# Patient Record
Sex: MALE | Race: WHITE | URBAN - METROPOLITAN AREA
[De-identification: names, ages, dates, MRNs, and addresses within clinical notes are randomized per-mention and may not be internally consistent; named-entity substitution may affect disease eponyms.]

---

## 2021-09-22 ENCOUNTER — OFFICE VISIT (OUTPATIENT)
Dept: URBAN - METROPOLITAN AREA CLINIC 98 | Facility: CLINIC | Age: 36
End: 2021-09-22

## 2021-09-22 RX ORDER — INFLIXIMAB 100 MG/10ML
INFUSE 3 MG/KG OVER NO LESS THAN 2 HOUR(S) BY INTRAVENOUS ROUTE EVERY 8 WEEKS INJECTION, POWDER, LYOPHILIZED, FOR SOLUTION INTRAVENOUS
Qty: 1 | Refills: 0 | Status: ACTIVE | COMMUNITY
Start: 1900-01-01

## 2021-09-30 ENCOUNTER — TELEPHONE ENCOUNTER (OUTPATIENT)
Dept: URBAN - METROPOLITAN AREA CLINIC 92 | Facility: CLINIC | Age: 36
End: 2021-09-30

## 2021-09-30 ENCOUNTER — OFFICE VISIT (OUTPATIENT)
Dept: URBAN - METROPOLITAN AREA CLINIC 98 | Facility: CLINIC | Age: 36
End: 2021-09-30

## 2021-09-30 ENCOUNTER — WEB ENCOUNTER (OUTPATIENT)
Dept: URBAN - METROPOLITAN AREA CLINIC 98 | Facility: CLINIC | Age: 36
End: 2021-09-30

## 2021-09-30 RX ORDER — INFLIXIMAB 100 MG/10ML
10 MG/KG AS DIRECTED INJECTION, POWDER, LYOPHILIZED, FOR SOLUTION INTRAVENOUS
Qty: 1 BAG | Refills: 6 | OUTPATIENT
Start: 2021-09-30 | End: 2022-04-14

## 2021-09-30 RX ORDER — INFLIXIMAB 100 MG/10ML
INFUSE 3 MG/KG OVER NO LESS THAN 2 HOUR(S) BY INTRAVENOUS ROUTE EVERY 8 WEEKS INJECTION, POWDER, LYOPHILIZED, FOR SOLUTION INTRAVENOUS
Qty: 1 | Refills: 0 | Status: ACTIVE | COMMUNITY
Start: 1900-01-01

## 2021-09-30 NOTE — HPI-TODAY'S VISIT:
35 yo male with UC on Remicade 5 vials, Vernon brand, getting 5 vials q 8 weeks. Was seeing Dr. Phelan Last infusion was July 28, 9 weeks ago. Infusions went fine.  No pain diarrea or bleeding EIM none  Dates of pfizer vaccine - second dose Feb. Third dose today.  No primary MD  No other medical problems.  Takes zyrtec pre -Vernon Vit D Calcium  Ins BCBS Andrea Arciniega working at [employer] ---- was [facility]     ============================ 8/14/19  Follow-up UC    History Of Present Illness  This is a scheduled appointment for this patient, a 34 year old /White male, after a previous visit on 08/14/2019, for a follow-up evaluation of Ulcerative colitis.     35 yo male comes in for f'u\ Last seen 6/14  Wife Demian Darling and all leave for 1 year [facility] 9/18 or so  Last eVrnon level nwas 7/30 and was a 10 week interval------7.0  Prior infusion was 5/21  will check next level before departure for [place] 9/12 and departs 9/18.  Next infusion 9/12 and will be 6 week interval  No pain diarrhea bleeding. 3-5 formed stools a day.  Labs from 6/14 all wwnl.   Last dose of aza was June 14  Got Anser test denial by Goldie, as ususual  Last colonoscopyu 2/26/19 near deep remission  Ame Huerta MD at [facility]  ==== 6/14/19 35 yo male with UC comes in for 3 month f/u  Getting  5 mg/kg q 8 weeks after haing high dose 10 mg/kg q 4 and then de-escalate.  Now on aza 50 mg a day, down from 4 per day in years past.  No pain diarrhea or bleeding.  Will stop aza 50 mg and get 6TG level today.  Get vernon level at next trough.  see back in 2 months.  do colonoscopy late aug or sept well before departure.  Plan--- try stopping aza get level get trought ifx at next trough, on current 5mg/kg  Leaving for [facility] 9/19 f/u 2 months ck labs do colonoscopy  hopefully send on IFX monotherapy.     ============== 3/11/19  35 yo male with UC comes in for f/u.  Had  colonoscopy 2/26/18 and it showed no active inflamation and normal TI.  There were diffuse pseudopolyps.  No active UC syx, but GI syx increased withj Melech associated loss of sleep.  Has been taking Canasa for the past few months and anorectal syx/bleeding resolved.  Colon and bx, including the distal rectum  He can use Canasa prn and tub bath and consider Crohns if continued syx but likely not.  Considering an extended trip to [place] this summer.  Current insurance is [employer] thru [employer].  When they called about getting Remicade in [place], they were told it was prohibitive.  Last DEXA??8/14/18 showed osteopenia.  Taking caltrate with D. No h/o kidney stones.   Last eye exam ----??   ================= 11/14/18  32 yo male with severe UC doing miuch better  3 stools a day blood from hemorrhoids or anorectum. No rectal or abd pain..  Has not used Canasa yet.  Review of labs from Oct 9 shows low alb of 2.6 and mild elevation of calpro at 250 and lacto at 33.  He does not think he was doing better on Vernon every 4 weeks but backed off due to cesar trought level 3/12/18 of 45.6 and prior low level of 9.4 in 10/30/17  Baby melech 6'13oz and he is doing great.    doing well the past few months.   taking caltrate with D.   ============  32 yo male. Syx are stable. No pain diarrhea or bleeding except once a week or so. EIM- Saw Dr. Matthew for rash and dryness and hair falling out.  She said it was all autoimmune Clobetasol ointment for scalp Alclometasone cream for face. Hair has not regrown.  Asymmetric right post auricular.  Rectal bleeding is daily. 2-5 stools a day. Blood on wiping and not in the stool. Dexa scan done---showed osteopenia x 2 sites and normal at 1. No os Suggest caltrate with d bid.   Energy is good when he exercise and gets sleep. Weight training.  1/23/18 colonoscopy showed redness wqhich is inavtive UC as all bx were nl.  Quiescent. Willl repaet Jan or earlier.  6/15/18 calpro and lacto was 3x elevated so inflammation not fully controlled tho esr and crp were nl.  Current Vernon is 10 mg/kg = 700 mg every 8 weeks.  May need to shorten interval and will get TDM before next dose on Oct 9.  ========= 6/13/18 ov 32 yo male with UC dx 2015 comes in for 4 month f/u and is still doing very well.  No pain, diarrhea, or bleeding but gets these syx once a week.  AGWS.  EIM none.  Getting Remicade 10 mg/kg and "dandruff" and had it 6 months ago but did not mention.  Wife is pregnant.  No concerns.  Had IFX level of  45 on 10mg /kg q 4  3/18 4 week trough and prior level when inflamed was 9.  Advised reducing to 10 mg/kg q 8 and in process.  Had hi calpro      Past Medical History  Disease Name Date Onset Notes  Hypoalbuminemia 11/14/2018 --   IBS (irritable bowel syndrome) unk 06/01/2016 - 05/03/2016 - 01/20/2016 -   Rectal bleeding 09/14/2018 --   Ulcerative Colitis unk 07/01/2016 - 06/01/2016 - 05/03/2016 - 02/08/2016 - 01/20/2016 - 01/12/2016 - [facility] 09/17/2015 -   Ulcerative colitis 06/14/2019 --       Past Surgical History  Procedure Name Date Notes  Clavicle surgery unk 07/01/2016 - miniscusal 06/01/2016 - 06/01/2016 - unk   Colonoscopy 2/26/2019 03/11/2019 - 11/14/2018 - 09/14/2018 - 06/13/2018 - 02/12/2018 -       Medication List  Name Date Started Instructions  Remicade 100 mg intravenous recon soln  infuse 3 mg/kg over no less than 2 hour(s) by intravenous route every 8 weeks  Vitamin D3 5,000 unit oral tablet  --       Allergy List  Allergen Name Date Reaction Notes  No Known Environmental Allergies --  --  06/01/2016 - 05/03/2016 - 01/12/2016 - [facility] 10/28/2015 - 10/07/2015 - 09/17/2015 -   No Known Food Allergies --  --  06/01/2016 - 05/03/2016 - 01/12/2016 - [facility] 10/28/2015 - 10/07/2015 - 09/17/2015 -   other 7/27/2016 --  07/27/2016 - [facility]       Family Medical History  Disease Name Relative/Age Notes  Family history of Crohn's disease Brother/17  6/1/2016      Social History  Finding Status Start/Stop Quantity Notes  Alcohol Current some day --/-- --  08/14/2019 - 01/12/2017 - 11/10/2016 - 06/01/2016 - 05/03/2016 - 02/08/2016 - 01/20/2016 - 01/12/2016 - [facility] 10/28/2015 - 10/07/2015 - 09/17/2015 - 09/02/2015 - Once per week  Denies illicit substance abuse --  --/-- --  08/14/2019 - 03/11/2019 -   Tobacco Never --/-- --  06/14/2019 - 03/11/2019 - 11/14/2018 - 09/14/2018 - 06/13/2018 - 02/12/2018 - 10/20/2017 - 06/07/2017 - 03/15/2017 - 02/20/2017 - 01/12/2017 - 12/05/2016 -       Review of Systems  ConstitutionalDenies : body aches, chills, fatigue, fever, loss of appetite, malaise, night sweats, weight gain, weight loss  EyesDenies : blurred vision, visual changes  HENTDenies : Ear Pain/Ringing, hearing loss, Mouth Ulcers/Sores, nose bleeds, problems with gums/teeth, trouble swallowing  CardiovascularDenies : chest pain, leaky heart valves, heart murmur, heart racing/skipping, high blood pressure, palpitations  RespiratoryDenies : chronic cough, shortness of breath, wheezing or asthma symptoms  GastrointestinalDenies : Abdominal Pain/discomfort, Anal/Rectal Pain or Itching, Anal Spasm, Black Stool, Bloating/belching/gaseouness, Change of Bowel Habit, Constipation, Diarrhea/Loose Stool, Difficulty in Swallowing, Heartburn/esophageal reflux, Hemorrhoids, Indigestion, Mucus in Stool, Nausea/vomiting, Rectal Bleeding, Unintentional Weight Loss  GenitourinaryDenies : Blood in Urine, Burning/pain with Urination, frequency, Recent/frequent UTI, Kidney Stones  IntegumentDenies : itching/dry skin, jaundice, rashes, bumps or sores  NeurologicDenies : headaches, dizziness/vertigo, head trauma/injury, recent numbness/weakness, seizures  MusculoskeletalDenies : back pain, decreased range of motion, joint pain/arthritis, Problems Walking/calf or leg pain  EndocrineDenies : bruise easily, excessive thirst, heat/cold intolerance, history of high or low blood sugar  PsychiatricDenies : anxiety, changes in sleep pattern, depression, loss of memory  Heme-LymphDenies : Bleeding Problems, Enlarged Nodes/Swolen Glands, excessive bruising, history of anemia  Allergic-ImmunologicDenies : seasonal allergies    Vitals  Date Time BP Position Site L\R Cuff Size HR RR TEMP (F) WT  HT  BMI kg/m2 BSA m2 O2 Sat HC     08/14/2019 09:32 /70 Sitting    60 - R  97.9 136lbs 8oz 5'  7" 21.38 1.71        Physical Examination  ConstitutionalAppearance : Well nourished, well developed patient in no distress. Ambulating without difficulty.  Ability to Communicate : Normal communication ability  EyesConjunctivae and Eyelids : Conjunctivae and eyelids appear normal  Sclerae : White without injection  HENTHead :  Inspection : Normocephalic and atraumatic  Face :  Inspection : Face within normal limits  Ears :  External Ears : External ears within normal limits  Nose/Nasopharynx :  External Nose : External nose normal appearance, nares patent, no nasal discharge  Mouth and Throat :  General : Oral cavity appearance normal, breath odor normal  Lips : Appearance normal  NeckInspection and Palpation : Normal appearance without tenderness on palpation or deformities, trachea midline  Thyroid : Normal size without tenderness, nodules or masses  Jugular Veins : no JVD  ChestInspection of Chest : No lesions, deformities or traumatic injuries present. No significant scars are present.  Respiratory Effort : Breathing is unlabored without accessory muscle use  Palpation of Chest : Chest wall non-tender with no masses present. Tactile fremitus is normal  Auscultation : Normal breath sounds  CardiovascularHeart :  Auscultation : Heart rate is regular with normal rhythm. No murmurs are heard. No edema.  GastrointestinalAbdominal Exam : Abdomen soft without rigidity or guarding, abdomen non-tender to palpation, normal bowel sounds, no masses present, non-distended  Liver and Spleen : No hepatomegaly present. Liver is non-tender to palpation and spleen is not palpable.  LymphaticNeck : No lymphadenopathy present  Axillae : No lymphadenopathy present  Groin : No lymphadenopathy present  MusculoskeletalGait and Station : Normal Gait, normal station  Neck/Spine/Pelvis : No tenderness of deformities present.  SkinGeneral Inspection : No rashes, lesions or areas of discoloration present. Skin turgor is normal.  General Palpation : No abnormalities, masses or tenderness on palpation.  Neurologic and PsychiatricOrientation : Oriented to person, place and time  Sensation : Normal sensation  Memory : Short and long term memory intact.  Mood and Affect : Normal mood with an appropriate affect      Assessment  Ulcerative colitis     556.6  Hypoalbuminemia     273.8/E88.09  Rectal bleeding     569.3/K62.5  Ulcerative Colitis     556.0    Plan  OrdersPatient not identified as an unhealthy alcohol user when screene () - - 08/14/2019  Influenza immunization administered or previously received () - - 08/14/2019  BMI is documented within normal parameters and no follow-up plan () - - 08/14/2019  Current tobacco non-user (CAD, cap, COPD, PV) (dm) (IBD) (1036F, ) - - 08/14/2019  Colorectal cancer screening results documented and reviewed (PV) (3017F) - - 08/14/2019  Documentation of current medications. () - - 08/14/2019  CMP (comprehensive metabolic panel) (26763) - - 08/14/2019  Sed rate (33836) - - 08/14/2019  C-reactive protein (83196) - - 08/14/2019  CBC with diff (43903) - - 08/14/2019  Infliximab Concentration and Anti-Infliximab Antibody (02403) - - 08/14/2019  InstructionsI have documented a list of current medications and reviewed it with the patient.  I encouraged the patient to diet and exercise.  DispositionReturn To Clinic in 1 year    5 level 40 minute 80% counselling         Electronically Signed by: Uvaldo Meraz MD -Author on August 14, 2019 10:15:03 AM

## 2021-09-30 NOTE — PHYSICAL EXAM CONSTITUTIONAL:
normal, alert, in no acute distress, well developed, well nourished, ambulating without difficulty, normal communication ability 0.5

## 2021-10-10 LAB
A/G RATIO: 2.1
ALBUMIN: 5
ALKALINE PHOSPHATASE: 40
ALT (SGPT): 18
ANTI-INFLIXIMAB ANTIBODY: <22
AST (SGOT): 21
BASO (ABSOLUTE): 0
BASOS: 1
BILIRUBIN, TOTAL: 1.2
BUN/CREATININE RATIO: 12
BUN: 13
C-REACTIVE PROTEIN, QUANT: <1
CALCIUM: 10
CARBON DIOXIDE, TOTAL: 28
CHLORIDE: 103
CREATININE: 1.07
EGFR IF AFRICN AM: 104
EGFR IF NONAFRICN AM: 90
EOS (ABSOLUTE): 0.1
EOS: 2
FERRITIN, SERUM: 46
FOLATE (FOLIC ACID), SERUM: 16.3
GLOBULIN, TOTAL: 2.4
GLUCOSE: 95
HBSAG SCREEN: NEGATIVE
HEMATOCRIT: 44.6
HEMATOLOGY COMMENTS:: (no result)
HEMOGLOBIN: 14.9
HEP B SURFACE AB, QUAL: REACTIVE
IMMATURE CELLS: (no result)
IMMATURE GRANS (ABS): 0
IMMATURE GRANULOCYTES: 0
INFLIXIMAB DRUG LEVEL: 14
IRON BIND.CAP.(TIBC): 284
IRON SATURATION: 44
IRON: 124
LYMPHS (ABSOLUTE): 1.6
LYMPHS: 38
MCH: 31.4
MCHC: 33.4
MCV: 94
MONOCYTES(ABSOLUTE): 0.5
MONOCYTES: 12
NEUTROPHILS (ABSOLUTE): 2
NEUTROPHILS: 47
NRBC: (no result)
PLATELETS: 220
POTASSIUM: 5
PROTEIN, TOTAL: 7.4
QUANTIFERON CRITERIA: (no result)
QUANTIFERON INCUBATION: (no result)
QUANTIFERON MITOGEN VALUE: 3.15
QUANTIFERON NIL VALUE: 0.05
QUANTIFERON TB1 AG VALUE: 0.04
QUANTIFERON TB2 AG VALUE: 0.04
QUANTIFERON-TB GOLD PLUS: NEGATIVE
RBC: 4.75
RDW: 12.4
SEDIMENTATION RATE-WESTERGREN: 2
SODIUM: 142
UIBC: 160
VITAMIN B12: 443
VITAMIN D, 25-HYDROXY: 36.8
WBC: 4.2

## 2021-11-05 ENCOUNTER — OFFICE VISIT (OUTPATIENT)
Dept: URBAN - METROPOLITAN AREA CLINIC 91 | Facility: CLINIC | Age: 36
End: 2021-11-05
Payer: COMMERCIAL

## 2021-11-05 ENCOUNTER — LAB OUTSIDE AN ENCOUNTER (OUTPATIENT)
Dept: URBAN - METROPOLITAN AREA CLINIC 98 | Facility: CLINIC | Age: 36
End: 2021-11-05

## 2021-11-05 ENCOUNTER — TELEPHONE ENCOUNTER (OUTPATIENT)
Dept: URBAN - METROPOLITAN AREA CLINIC 18 | Facility: CLINIC | Age: 36
End: 2021-11-05

## 2021-11-05 VITALS
HEIGHT: 67 IN | RESPIRATION RATE: 16 BRPM | HEART RATE: 65 BPM | DIASTOLIC BLOOD PRESSURE: 70 MMHG | BODY MASS INDEX: 21.35 KG/M2 | SYSTOLIC BLOOD PRESSURE: 109 MMHG | TEMPERATURE: 97.6 F | WEIGHT: 136 LBS

## 2021-11-05 DIAGNOSIS — K51.80 CHRONIC PANCOLONIC ULCERATIVE COLITIS: ICD-10-CM

## 2021-11-05 PROCEDURE — 96413 CHEMO IV INFUSION 1 HR: CPT | Performed by: INTERNAL MEDICINE

## 2021-11-05 RX ORDER — INFLIXIMAB 100 MG/10ML
INFUSE 3 MG/KG OVER NO LESS THAN 2 HOUR(S) BY INTRAVENOUS ROUTE EVERY 8 WEEKS INJECTION, POWDER, LYOPHILIZED, FOR SOLUTION INTRAVENOUS
Qty: 1 | Refills: 0 | Status: ACTIVE | COMMUNITY
Start: 1900-01-01

## 2021-11-05 RX ORDER — INFLIXIMAB 100 MG/10ML
10 MG/KG AS DIRECTED INJECTION, POWDER, LYOPHILIZED, FOR SOLUTION INTRAVENOUS
Qty: 1 BAG | Refills: 6 | Status: ACTIVE | COMMUNITY
Start: 2021-09-30 | End: 2022-04-14

## 2021-11-15 LAB
CALPROTECTIN, FECAL: 186
LACTOFERRIN, FECAL, QUANT.: 18.71

## 2021-12-13 ENCOUNTER — OFFICE VISIT (OUTPATIENT)
Dept: URBAN - METROPOLITAN AREA CLINIC 98 | Facility: CLINIC | Age: 36
End: 2021-12-13
Payer: COMMERCIAL

## 2021-12-13 DIAGNOSIS — K51.018 CHRONIC ULCERATIVE ENTEROCOLITIS WITH OTHER COMPLICATION: ICD-10-CM

## 2021-12-13 DIAGNOSIS — K62.5 RECTAL BLEEDING: ICD-10-CM

## 2021-12-13 DIAGNOSIS — E88.09 HYPOALBUMINEMIA: ICD-10-CM

## 2021-12-13 PROCEDURE — 99214 OFFICE O/P EST MOD 30 MIN: CPT | Performed by: INTERNAL MEDICINE

## 2021-12-13 RX ORDER — INFLIXIMAB 100 MG/10ML
INFUSE 3 MG/KG OVER NO LESS THAN 2 HOUR(S) BY INTRAVENOUS ROUTE EVERY 8 WEEKS INJECTION, POWDER, LYOPHILIZED, FOR SOLUTION INTRAVENOUS
Qty: 1 | Refills: 0 | Status: ACTIVE | COMMUNITY
Start: 1900-01-01

## 2021-12-13 NOTE — HPI-TODAY'S VISIT:
35 yo male comes in with Melech. Recent stool tests showed inflammation. Started a job, [employer].  No syx. lacto/calpro were eleated at 18.6 and 186.  Vernon level at 4 weeks was 14 on 10/10/21 with next dose on 11/5  Interval was longer maybe 12 weeks when he came back from [place] and got scheduled late so let check labs and lacto/calpro in 2 cycles more  Lab review from 2/21/22 showed normalization of biomarkers with nl lacto, calpro, and low titer IFX ati. Likely wnl.  ============================= 9/30/21  35 yo male with UC on Remicade 5 vials, Vernon brand, getting 5 vials q 8 weeks. Was seeing Dr. Phelan Last infusion was July 28, 9 weeks ago. Infusions went fine.  No pain diarrea or bleeding EIM none  Dates of pfizer vaccine - second dose Feb. Third dose today.  No primary MD  No other medical problems.  Takes zyrtec pre -Vernon Vit D Calcium  Ins [employer] Prempam Arcin[employer]ga working at [employer] ---- was [facility] [employer]     ============================ 8/14/19  Follow-up UC    History Of Present Illness  This is a scheduled appointment for this patient, a 34 year old /White male, after a previous visit on 08/14/2019, for a follow-up evaluation of Ulcerative colitis.     33 yo male comes in for f'u\ Last seen 6/14  Wife Demian Darling and all leave for 1 year [facility] 9/18 or so  Last Vernno level nwas 7/30 and was a 10 week interval------7.0  Prior infusion was 5/21  will check next level before departure for [place] 9/12 and departs 9/18.  Next infusion 9/12 and will be 6 week interval  No pain diarrhea bleeding. 3-5 formed stools a day.  Labs from 6/14 all wwnl.   Last dose of aza was June 14  Got Anser test denial by [employer], as ususual  Last colonoscopyu 2/26/19 near deep remission  Ame Huerta MD at [facility]  ==== 6/14/19 33 yo male with UC comes in for 3 month f/u  Getting  5 mg/kg q 8 weeks after haing high dose 10 mg/kg q 4 and then de-escalate.  Now on aza 50 mg a day, down from 4 per day in years past.  No pain diarrhea or bleeding.  Will stop aza 50 mg and get 6TG level today.  Get vernon level at next trough.  see back in 2 months.  do colonoscopy late aug or sept well before departure.  Plan--- try stopping aza get level get trought ifx at next trough, on current 5mg/kg  Leaving for [place] 9/19 f/u 2 months ck labs do colonoscopy  hopefully send on IFX monotherapy.     ============== 3/11/19  33 yo male with UC comes in for f/u.  Had  colonoscopy 2/26/18 and it showed no active inflamation and normal TI.  There were diffuse pseudopolyps.  No active UC syx, but GI syx increased withj Melech associated loss of sleep.  Has been taking Canasa for the past few months and anorectal syx/bleeding resolved.  Colon and bx, including the distal rectum  He can use Canasa prn and tub bath and consider Crohns if continued syx but likely not.  Considering an extended trip to [place] this summer.  Current insurance is [employer] thru [employer].  When they called about getting Remicade in [place], they were told it was prohibitive.  Last DEXA??8/14/18 showed osteopenia.  Taking caltrate with D. No h/o kidney stones.   Last eye exam ----??   ================= 11/14/18  34 yo male with severe UC doing miuch better  3 stools a day blood from hemorrhoids or anorectum. No rectal or abd pain..  Has not used Canasa yet.  Review of labs from Oct 9 shows low alb of 2.6 and mild elevation of calpro at 250 and lacto at 33.  He does not think he was doing better on Vernon every 4 weeks but backed off due to cesar trought level 3/12/18 of 45.6 and prior low level of 9.4 in 10/30/17  Baby melech 6'13oz and he is doing great.    doing well the past few months.   taking caltrate with D.   ============  34 yo male. Syx are stable. No pain diarrhea or bleeding except once a week or so. EIM- Saw Dr. Matthew for rash and dryness and hair falling out.  She said it was all autoimmune Clobetasol ointment for scalp Alclometasone cream for face. Hair has not regrown.  Asymmetric right post auricular.  Rectal bleeding is daily. 2-5 stools a day. Blood on wiping and not in the stool. Dexa scan done---showed osteopenia x 2 sites and normal at 1. No os Suggest caltrate with d bid.   Energy is good when he exercise and gets sleep. Weight training.  1/23/18 colonoscopy showed redness wqhich is inavtive UC as all bx were nl.  Quiescent. Willl repaet Jan or earlier.  6/15/18 calpro and lacto was 3x elevated so inflammation not fully controlled tho esr and crp were nl.  Current Vernon is 10 mg/kg = 700 mg every 8 weeks.  May need to shorten interval and will get TDM before next dose on Oct 9.  ========= 6/13/18 ov 34 yo male with UC dx 2015 comes in for 4 month f/u and is still doing very well.  No pain, diarrhea, or bleeding but gets these syx once a week.  AGWS.  EIM none.  Getting Remicade 10 mg/kg and "dandruff" and had it 6 months ago but did not mention.  Wife is pregnant.  No concerns.  Had IFX level of  45 on 10mg /kg q 4  3/18 4 week trough and prior level when inflamed was 9.  Advised reducing to 10 mg/kg q 8 and in process.  Had hi calpro      Past Medical History  Disease Name Date Onset Notes  Hypoalbuminemia 11/14/2018 --   IBS (irritable bowel syndrome) unk 06/01/2016 - 05/03/2016 - 01/20/2016 -   Rectal bleeding 09/14/2018 --   Ulcerative Colitis unk 07/01/2016 - 06/01/2016 - 05/03/2016 - 02/08/2016 - 01/20/2016 - 01/12/2016 - lsh 09/17/2015 -   Ulcerative colitis 06/14/2019 --       Past Surgical History  Procedure Name Date Notes  Clavicle surgery unk 07/01/2016 - miniscusal 06/01/2016 - 06/01/2016 - unk   Colonoscopy 2/26/2019 03/11/2019 - 11/14/2018 - 09/14/2018 - 06/13/2018 - 02/12/2018 -       Medication List  Name Date Started Instructions  Remicade 100 mg intravenous recon soln  infuse 3 mg/kg over no less than 2 hour(s) by intravenous route every 8 weeks  Vitamin D3 5,000 unit oral tablet  --       Allergy List  Allergen Name Date Reaction Notes  No Known Environmental Allergies --  --  06/01/2016 - 05/03/2016 - 01/12/2016 - [facility] 10/28/2015 - 10/07/2015 - 09/17/2015 -   No Known Food Allergies --  --  06/01/2016 - 05/03/2016 - 01/12/2016 - [facility] 10/28/2015 - 10/07/2015 - 09/17/2015 -   other 7/27/2016 --  07/27/2016 - [facility]       Family Medical History  Disease Name Relative/Age Notes  Family history of Crohn's disease Brother/17  6/1/2016      Social History  Finding Status Start/Stop Quantity Notes  Alcohol Current some day --/-- --  08/14/2019 - 01/12/2017 - 11/10/2016 - 06/01/2016 - 05/03/2016 - 02/08/2016 - 01/20/2016 - 01/12/2016 - [facility] 10/28/2015 - 10/07/2015 - 09/17/2015 - 09/02/2015 - Once per week  Denies illicit substance abuse --  --/-- --  08/14/2019 - 03/11/2019 -   Tobacco Never --/-- --  06/14/2019 - 03/11/2019 - 11/14/2018 - 09/14/2018 - 06/13/2018 - 02/12/2018 - 10/20/2017 - 06/07/2017 - 03/15/2017 - 02/20/2017 - 01/12/2017 - 12/05/2016 -       Review of Systems  ConstitutionalDenies : body aches, chills, fatigue, fever, loss of appetite, malaise, night sweats, weight gain, weight loss  EyesDenies : blurred vision, visual changes  HENTDenies : Ear Pain/Ringing, hearing loss, Mouth Ulcers/Sores, nose bleeds, problems with gums/teeth, trouble swallowing  CardiovascularDenies : chest pain, leaky heart valves, heart murmur, heart racing/skipping, high blood pressure, palpitations  RespiratoryDenies : chronic cough, shortness of breath, wheezing or asthma symptoms  GastrointestinalDenies : Abdominal Pain/discomfort, Anal/Rectal Pain or Itching, Anal Spasm, Black Stool, Bloating/belching/gaseouness, Change of Bowel Habit, Constipation, Diarrhea/Loose Stool, Difficulty in Swallowing, Heartburn/esophageal reflux, Hemorrhoids, Indigestion, Mucus in Stool, Nausea/vomiting, Rectal Bleeding, Unintentional Weight Loss  GenitourinaryDenies : Blood in Urine, Burning/pain with Urination, frequency, Recent/frequent UTI, Kidney Stones  IntegumentDenies : itching/dry skin, jaundice, rashes, bumps or sores  NeurologicDenies : headaches, dizziness/vertigo, head trauma/injury, recent numbness/weakness, seizures  MusculoskeletalDenies : back pain, decreased range of motion, joint pain/arthritis, Problems Walking/calf or leg pain  EndocrineDenies : bruise easily, excessive thirst, heat/cold intolerance, history of high or low blood sugar  PsychiatricDenies : anxiety, changes in sleep pattern, depression, loss of memory  Heme-LymphDenies : Bleeding Problems, Enlarged Nodes/Swolen Glands, excessive bruising, history of anemia  Allergic-ImmunologicDenies : seasonal allergies    Vitals  Date Time BP Position Site L\R Cuff Size HR RR TEMP (F) WT  HT  BMI kg/m2 BSA m2 O2 Sat      08/14/2019 09:32 /70 Sitting    60 - R  97.9 136lbs 8oz 5'  7" 21.38 1.71        Physical Examination  ConstitutionalAppearance : Well nourished, well developed patient in no distress. Ambulating without difficulty.  Ability to Communicate : Normal communication ability  EyesConjunctivae and Eyelids : Conjunctivae and eyelids appear normal  Sclerae : White without injection  HENTHead :  Inspection : Normocephalic and atraumatic  Face :  Inspection : Face within normal limits  Ears :  External Ears : External ears within normal limits  Nose/Nasopharynx :  External Nose : External nose normal appearance, nares patent, no nasal discharge  Mouth and Throat :  General : Oral cavity appearance normal, breath odor normal  Lips : Appearance normal  NeckInspection and Palpation : Normal appearance without tenderness on palpation or deformities, trachea midline  Thyroid : Normal size without tenderness, nodules or masses  Jugular Veins : no JVD  ChestInspection of Chest : No lesions, deformities or traumatic injuries present. No significant scars are present.  Respiratory Effort : Breathing is unlabored without accessory muscle use  Palpation of Chest : Chest wall non-tender with no masses present. Tactile fremitus is normal  Auscultation : Normal breath sounds  CardiovascularHeart :  Auscultation : Heart rate is regular with normal rhythm. No murmurs are heard. No edema.  GastrointestinalAbdominal Exam : Abdomen soft without rigidity or guarding, abdomen non-tender to palpation, normal bowel sounds, no masses present, non-distended  Liver and Spleen : No hepatomegaly present. Liver is non-tender to palpation and spleen is not palpable.  LymphaticNeck : No lymphadenopathy present  Axillae : No lymphadenopathy present  Groin : No lymphadenopathy present  MusculoskeletalGait and Station : Normal Gait, normal station  Neck/Spine/Pelvis : No tenderness of deformities present.  SkinGeneral Inspection : No rashes, lesions or areas of discoloration present. Skin turgor is normal.  General Palpation : No abnormalities, masses or tenderness on palpation.  Neurologic and PsychiatricOrientation : Oriented to person, place and time  Sensation : Normal sensation  Memory : Short and long term memory intact.  Mood and Affect : Normal mood with an appropriate affect      Assessment  Ulcerative colitis     556.6  Hypoalbuminemia     273.8/E88.09  Rectal bleeding     569.3/K62.5  Ulcerative Colitis     556.0    Plan  OrdersPatient not identified as an unhealthy alcohol user when screene () - - 08/14/2019  Influenza immunization administered or previously received () - - 08/14/2019  BMI is documented within normal parameters and no follow-up plan () - - 08/14/2019  Current tobacco non-user (CAD, cap, COPD, PV) (dm) (IBD) (1036F, ) - - 08/14/2019  Colorectal cancer screening results documented and reviewed (PV) (3017F) - - 08/14/2019  Documentation of current medications. () - - 08/14/2019  CMP (comprehensive metabolic panel) (58325) - - 08/14/2019  Sed rate (05459) - - 08/14/2019  C-reactive protein (91212) - - 08/14/2019  CBC with diff (45417) - - 08/14/2019  Infliximab Concentration and Anti-Infliximab Antibody (11086) - - 08/14/2019  InstructionsI have documented a list of current medications and reviewed it with the patient.  I encouraged the patient to diet and exercise.  DispositionReturn To Clinic in 1 year    5 level 40 minute 80% counselling         Electronically Signed by: Uvaldo Meraz MD -Author on August 14, 2019 10:15:03 AM

## 2021-12-16 ENCOUNTER — TELEPHONE ENCOUNTER (OUTPATIENT)
Dept: URBAN - METROPOLITAN AREA CLINIC 92 | Facility: CLINIC | Age: 36
End: 2021-12-16

## 2021-12-30 ENCOUNTER — OFFICE VISIT (OUTPATIENT)
Dept: URBAN - METROPOLITAN AREA CLINIC 91 | Facility: CLINIC | Age: 36
End: 2021-12-30
Payer: COMMERCIAL

## 2021-12-30 DIAGNOSIS — K51.80 CHRONIC PANCOLONIC ULCERATIVE COLITIS: ICD-10-CM

## 2021-12-30 PROCEDURE — 96413 CHEMO IV INFUSION 1 HR: CPT | Performed by: INTERNAL MEDICINE

## 2021-12-30 RX ORDER — INFLIXIMAB 100 MG/10ML
INFUSE 3 MG/KG OVER NO LESS THAN 2 HOUR(S) BY INTRAVENOUS ROUTE EVERY 8 WEEKS INJECTION, POWDER, LYOPHILIZED, FOR SOLUTION INTRAVENOUS
Qty: 1 | Refills: 0 | Status: ACTIVE | COMMUNITY
Start: 1900-01-01

## 2022-01-29 ENCOUNTER — TELEPHONE ENCOUNTER (OUTPATIENT)
Dept: URBAN - METROPOLITAN AREA CLINIC 92 | Facility: CLINIC | Age: 37
End: 2022-01-29

## 2022-01-29 RX ORDER — INFLIXIMAB 100 MG/10ML
AS DIRECTED INJECTION, POWDER, LYOPHILIZED, FOR SOLUTION INTRAVENOUS
Qty: 100 MILLIGRAMS | Refills: 0 | OUTPATIENT
Start: 2022-01-29 | End: 2022-02-28

## 2022-02-24 ENCOUNTER — LAB OUTSIDE AN ENCOUNTER (OUTPATIENT)
Dept: URBAN - METROPOLITAN AREA CLINIC 98 | Facility: CLINIC | Age: 37
End: 2022-02-24

## 2022-02-24 ENCOUNTER — OFFICE VISIT (OUTPATIENT)
Dept: URBAN - METROPOLITAN AREA CLINIC 97 | Facility: CLINIC | Age: 37
End: 2022-02-24
Payer: COMMERCIAL

## 2022-02-24 VITALS
SYSTOLIC BLOOD PRESSURE: 110 MMHG | TEMPERATURE: 96.8 F | RESPIRATION RATE: 18 BRPM | WEIGHT: 139 LBS | BODY MASS INDEX: 21.82 KG/M2 | HEART RATE: 67 BPM | DIASTOLIC BLOOD PRESSURE: 74 MMHG | HEIGHT: 67 IN

## 2022-02-24 DIAGNOSIS — K51.80 ULCERATIVE COLITIS: ICD-10-CM

## 2022-02-24 PROCEDURE — 96413 CHEMO IV INFUSION 1 HR: CPT | Performed by: INTERNAL MEDICINE

## 2022-02-24 RX ORDER — INFLIXIMAB 100 MG/10ML
AS DIRECTED INJECTION, POWDER, LYOPHILIZED, FOR SOLUTION INTRAVENOUS
Qty: 100 MILLIGRAMS | Refills: 0 | Status: ACTIVE | COMMUNITY
Start: 2022-01-29 | End: 2022-02-28

## 2022-02-24 RX ORDER — INFLIXIMAB 100 MG/10ML
INFUSE 3 MG/KG OVER NO LESS THAN 2 HOUR(S) BY INTRAVENOUS ROUTE EVERY 8 WEEKS INJECTION, POWDER, LYOPHILIZED, FOR SOLUTION INTRAVENOUS
Qty: 1 | Refills: 0 | Status: ACTIVE | COMMUNITY
Start: 1900-01-01

## 2022-03-07 LAB
A/G RATIO: 2.1
ALBUMIN: 4.8
ALKALINE PHOSPHATASE: 41
ALT (SGPT): 27
ANTI-INFLIXIMAB ANTIBODY: 55
AST (SGOT): 23
BASO (ABSOLUTE): 0
BASOS: 0
BILIRUBIN, TOTAL: 1.4
BUN/CREATININE RATIO: 13
BUN: 14
C-REACTIVE PROTEIN, QUANT: <1
CALCIUM: 9.8
CALPROTECTIN, FECAL: 65
CARBON DIOXIDE, TOTAL: 24
CHLORIDE: 104
CREATININE: 1.04
EGFR IF AFRICN AM: 108
EGFR IF NONAFRICN AM: 93
EOS (ABSOLUTE): 0.1
EOS: 2
GLOBULIN, TOTAL: 2.3
GLUCOSE: 88
HEMATOCRIT: 43.4
HEMATOLOGY COMMENTS:: (no result)
HEMOGLOBIN: 14.4
IMMATURE CELLS: (no result)
IMMATURE GRANS (ABS): 0
IMMATURE GRANULOCYTES: 0
INFLIXIMAB DRUG LEVEL: 13
LACTOFERRIN, FECAL, QUANT.: 1.6
LYMPHS (ABSOLUTE): 2
LYMPHS: 39
MCH: 31
MCHC: 33.2
MCV: 93
MONOCYTES(ABSOLUTE): 0.5
MONOCYTES: 10
NEUTROPHILS (ABSOLUTE): 2.6
NEUTROPHILS: 49
NRBC: (no result)
PLATELETS: 221
POTASSIUM: 4.4
PROTEIN, TOTAL: 7.1
RBC: 4.65
RDW: 12.4
SEDIMENTATION RATE-WESTERGREN: 2
SODIUM: 142
WBC: 5.3

## 2022-04-25 ENCOUNTER — OFFICE VISIT (OUTPATIENT)
Dept: URBAN - METROPOLITAN AREA CLINIC 97 | Facility: CLINIC | Age: 37
End: 2022-04-25
Payer: COMMERCIAL

## 2022-04-25 ENCOUNTER — TELEPHONE ENCOUNTER (OUTPATIENT)
Dept: URBAN - METROPOLITAN AREA CLINIC 97 | Facility: CLINIC | Age: 37
End: 2022-04-25

## 2022-04-25 VITALS
HEIGHT: 67 IN | HEART RATE: 59 BPM | RESPIRATION RATE: 18 BRPM | WEIGHT: 142 LBS | TEMPERATURE: 96.9 F | DIASTOLIC BLOOD PRESSURE: 79 MMHG | SYSTOLIC BLOOD PRESSURE: 120 MMHG | BODY MASS INDEX: 22.29 KG/M2

## 2022-04-25 DIAGNOSIS — K50.80 CROHN'S COLITIS: ICD-10-CM

## 2022-04-25 PROCEDURE — 96413 CHEMO IV INFUSION 1 HR: CPT | Performed by: INTERNAL MEDICINE

## 2022-04-25 RX ORDER — INFLIXIMAB 100 MG/10ML
INFUSE 3 MG/KG OVER NO LESS THAN 2 HOUR(S) BY INTRAVENOUS ROUTE EVERY 8 WEEKS INJECTION, POWDER, LYOPHILIZED, FOR SOLUTION INTRAVENOUS
Qty: 1 | Refills: 0 | Status: ACTIVE | COMMUNITY
Start: 1900-01-01

## 2022-06-08 ENCOUNTER — OFFICE VISIT (OUTPATIENT)
Dept: URBAN - METROPOLITAN AREA CLINIC 98 | Facility: CLINIC | Age: 37
End: 2022-06-08
Payer: COMMERCIAL

## 2022-06-08 DIAGNOSIS — K50.80 CROHN'S COLITIS: ICD-10-CM

## 2022-06-08 DIAGNOSIS — K62.5 RECTAL BLEEDING: ICD-10-CM

## 2022-06-08 DIAGNOSIS — K51.018 CHRONIC ULCERATIVE ENTEROCOLITIS WITH OTHER COMPLICATION: ICD-10-CM

## 2022-06-08 DIAGNOSIS — E88.09 HYPOALBUMINEMIA: ICD-10-CM

## 2022-06-08 PROCEDURE — 99215 OFFICE O/P EST HI 40 MIN: CPT | Performed by: INTERNAL MEDICINE

## 2022-06-08 RX ORDER — CALCIUM CARBONATE 500(1250)
1 TABLET WITH MEALS TABLET ORAL ONCE A DAY
Status: ACTIVE | COMMUNITY

## 2022-06-08 RX ORDER — INFLIXIMAB 100 MG/10ML
INFUSE 3 MG/KG OVER NO LESS THAN 2 HOUR(S) BY INTRAVENOUS ROUTE EVERY 8 WEEKS INJECTION, POWDER, LYOPHILIZED, FOR SOLUTION INTRAVENOUS
Qty: 1 | Refills: 0 | Status: ACTIVE | COMMUNITY
Start: 1900-01-01

## 2022-06-08 NOTE — HPI-TODAY'S VISIT:
38 yo male returns for 6 month f/u visit.  Doing well. No pain diarrhea or bleeddng, except some stress with lack of sleep and normalized. Typically 3-5 stools a day.  No other medical issues.  Overuse joint issues, no others.  Nazia good with job. 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  ===Timothy got both doses of Pfizer vax in [place] and got boosted Oct 2021 Needs second booster now. Get another in 4-6 months. Lacto and calpro - normalized after a couople adddiotnal cycles of remicade and got back on schedule.  Burps every day.  when hungry. Very common. Does get chest pain and when he burps, it goes away.      ========================= 12/13/21  35 yo male comes in with Darcy. Recent stool tests showed inflammation. Started a job, [employer].  No syx. lacto/calpro were eleated at 18.6 and 186.  Vernon level at 4 weeks was 14 on 10/10/21 with next dose on 11/5  Interval was longer maybe 12 weeks when he came back from [place] and got scheduled late so let check labs and lacto/calpro in 2 cycles more  ============================= 9/30/21  35 yo male with UC on Remicade 5 vials, Vernon brand, getting 5 vials q 8 weeks. Was seeing Dr. Phelan Last infusion was July 28, 9 weeks ago. Infusions went fine.  No pain diarrea or bleeding EIM none  Dates of pfizer vaccine - second dose Feb. Third dose today.  No primary MD  No other medical problems.  Takes zyrtec pre -Vernon Vit D Calcium  Ins [employer]   Helaina working at [employer] ---- was [employer]     ============================ 8/14/19  Follow-up UC    History Of Present Illness  This is a scheduled appointment for this patient, a 34 year old /White male, after a previous visit on 08/14/2019, for a follow-up evaluation of Ulcerative colitis.     35 yo male comes in for f'u\ Last seen 6/14  Wife Demian Darling and all leave for 1 year [place] 9/18 or so  Last Vernon level nwas 7/30 and was a 10 week interval------7.0  Prior infusion was 5/21  will check next level before departure for [place] 9/12 and departs 9/18.  Next infusion 9/12 and will be 6 week interval  No pain diarrhea bleeding. 3-5 formed stools a day.  Labs from 6/14 all wwnl.   Last dose of aza was June 14  Got Anser test denial by [employer], as ususual  Last colonoscopyu 2/26/19 near deep remission  Ame Huerta MD at [facility]  ==== 6/14/19 35 yo male with UC comes in for 3 month f/u  Getting  5 mg/kg q 8 weeks after haing high dose 10 mg/kg q 4 and then de-escalate.  Now on aza 50 mg a day, down from 4 per day in years past.  No pain diarrhea or bleeding.  Will stop aza 50 mg and get 6TG level today.  Get vernon level at next trough.  see back in 2 months.  do colonoscopy late aug or sept well before departure.  Plan--- try stopping aza get level get trought ifx at next trough, on current 5mg/kg  Leaving for [place] 9/19 f/u 2 months ck labs do colonoscopy  hopefully send on IFX monotherapy.     ============== 3/11/19  35 yo male with UC comes in for f/u.  Had  colonoscopy 2/26/18 and it showed no active inflamation and normal TI.  There were diffuse pseudopolyps.  No active UC syx, but GI syx increased withj Melech associated loss of sleep.  Has been taking Canasa for the past few months and anorectal syx/bleeding resolved.  Colon and bx, including the distal rectum  He can use Canasa prn and tub bath and consider Crohns if continued syx but likely not.  Considering an extended trip to [place] this summer.  Current insurance is [employer] [place] thru [place].  When they called about getting Remicade in [place], they were told it was prohibitive.  Last DEXA??8/14/18 showed osteopenia.  Taking caltrate with D. No h/o kidney stones.   Last eye exam ----??   ================= 11/14/18  32 yo male with severe UC doing miuch better  3 stools a day blood from hemorrhoids or anorectum. No rectal or abd pain..  Has not used Canasa yet.  Review of labs from Oct 9 shows low alb of 2.6 and mild elevation of calpro at 250 and lacto at 33.  He does not think he was doing better on Vernon every 4 weeks but backed off due to cesar trought level 3/12/18 of 45.6 and prior low level of 9.4 in 10/30/17  Baby melech 6'13oz and he is doing great.    doing well the past few months.   taking caltrate with D.   ============  32 yo male. Syx are stable. No pain diarrhea or bleeding except once a week or so. EIM- Saw Dr. Matthew for rash and dryness and hair falling out.  She said it was all autoimmune Clobetasol ointment for scalp Alclometasone cream for face. Hair has not regrown.  Asymmetric right post auricular.  Rectal bleeding is daily. 2-5 stools a day. Blood on wiping and not in the stool. Dexa scan done---showed osteopenia x 2 sites and normal at 1. No os Suggest caltrate with d bid.   Energy is good when he exercise and gets sleep. Weight training.  1/23/18 colonoscopy showed redness wqhich is inavtive UC as all bx were nl.  Quiescent. Willl repaet Jan or earlier.  6/15/18 calpro and lacto was 3x elevated so inflammation not fully controlled tho esr and crp were nl.  Current Vernon is 10 mg/kg = 700 mg every 8 weeks.  May need to shorten interval and will get TDM before next dose on Oct 9.  ========= 6/13/18 ov 32 yo male with UC dx 2015 comes in for 4 month f/u and is still doing very well.  No pain, diarrhea, or bleeding but gets these syx once a week.  AGWS.  EIM none.  Getting Remicade 10 mg/kg and "dandruff" and had it 6 months ago but did not mention.  Wife is pregnant.  No concerns.  Had IFX level of  45 on 10mg /kg q 4  3/18 4 week trough and prior level when inflamed was 9.  Advised reducing to 10 mg/kg q 8 and in process.  Had dieudonne donaldson      Past Medical History  Disease Name Date Onset Notes  Hypoalbuminemia 11/14/2018 --   IBS (irritable bowel syndrome) unk 06/01/2016 - 05/03/2016 - 01/20/2016 -   Rectal bleeding 09/14/2018 --   Ulcerative Colitis unk 07/01/2016 - 06/01/2016 - 05/03/2016 - 02/08/2016 - 01/20/2016 - 01/12/2016 - [facility] 09/17/2015 -   Ulcerative colitis 06/14/2019 --       Past Surgical History  Procedure Name Date Notes  Clavicle surgery unk 07/01/2016 - miniscusal 06/01/2016 - 06/01/2016 - unk   Colonoscopy 2/26/2019 03/11/2019 - 11/14/2018 - 09/14/2018 - 06/13/2018 - 02/12/2018 -       Medication List  Name Date Started Instructions  Remicade 100 mg intravenous recon soln  infuse 3 mg/kg over no less than 2 hour(s) by intravenous route every 8 weeks  Vitamin D3 5,000 unit oral tablet  --       Allergy List  Allergen Name Date Reaction Notes  No Known Environmental Allergies --  --  06/01/2016 - 05/03/2016 - 01/12/2016 - [facility] 10/28/2015 - 10/07/2015 - 09/17/2015 -   No Known Food Allergies --  --  06/01/2016 - 05/03/2016 - 01/12/2016 - [facility] 10/28/2015 - 10/07/2015 - 09/17/2015 -   other 7/27/2016 --  07/27/2016 - [facility]       Family Medical History  Disease Name Relative/Age Notes  Family history of Crohn's disease Brother/17  6/1/2016      Social History  Finding Status Start/Stop Quantity Notes  Alcohol Current some day --/-- --  08/14/2019 - 01/12/2017 - 11/10/2016 - 06/01/2016 - 05/03/2016 - 02/08/2016 - 01/20/2016 - 01/12/2016 - [facility] 10/28/2015 - 10/07/2015 - 09/17/2015 - 09/02/2015 - Once per week  Denies illicit substance abuse --  --/-- --  08/14/2019 - 03/11/2019 -   Tobacco Never --/-- --  06/14/2019 - 03/11/2019 - 11/14/2018 - 09/14/2018 - 06/13/2018 - 02/12/2018 - 10/20/2017 - 06/07/2017 - 03/15/2017 - 02/20/2017 - 01/12/2017 - 12/05/2016 -       Review of Systems  ConstitutionalDenies : body aches, chills, fatigue, fever, loss of appetite, malaise, night sweats, weight gain, weight loss  EyesDenies : blurred vision, visual changes  HENTDenies : Ear Pain/Ringing, hearing loss, Mouth Ulcers/Sores, nose bleeds, problems with gums/teeth, trouble swallowing  CardiovascularDenies : chest pain, leaky heart valves, heart murmur, heart racing/skipping, high blood pressure, palpitations  RespiratoryDenies : chronic cough, shortness of breath, wheezing or asthma symptoms  GastrointestinalDenies : Abdominal Pain/discomfort, Anal/Rectal Pain or Itching, Anal Spasm, Black Stool, Bloating/belching/gaseouness, Change of Bowel Habit, Constipation, Diarrhea/Loose Stool, Difficulty in Swallowing, Heartburn/esophageal reflux, Hemorrhoids, Indigestion, Mucus in Stool, Nausea/vomiting, Rectal Bleeding, Unintentional Weight Loss  GenitourinaryDenies : Blood in Urine, Burning/pain with Urination, frequency, Recent/frequent UTI, Kidney Stones  IntegumentDenies : itching/dry skin, jaundice, rashes, bumps or sores  NeurologicDenies : headaches, dizziness/vertigo, head trauma/injury, recent numbness/weakness, seizures  MusculoskeletalDenies : back pain, decreased range of motion, joint pain/arthritis, Problems Walking/calf or leg pain  EndocrineDenies : bruise easily, excessive thirst, heat/cold intolerance, history of high or low blood sugar  PsychiatricDenies : anxiety, changes in sleep pattern, depression, loss of memory  Heme-LymphDenies : Bleeding Problems, Enlarged Nodes/Swolen Glands, excessive bruising, history of anemia  Allergic-ImmunologicDenies : seasonal allergies    Vitals  Date Time BP Position Site L\R Cuff Size HR RR TEMP (F) WT  HT  BMI kg/m2 BSA m2 O2 Sat HC     08/14/2019 09:32 /70 Sitting    60 - R  97.9 136lbs 8oz 5'  7" 21.38 1.71        Physical Examination  ConstitutionalAppearance : Well nourished, well developed patient in no distress. Ambulating without difficulty.  Ability to Communicate : Normal communication ability  EyesConjunctivae and Eyelids : Conjunctivae and eyelids appear normal  Sclerae : White without injection  HENTHead :  Inspection : Normocephalic and atraumatic  Face :  Inspection : Face within normal limits  Ears :  External Ears : External ears within normal limits  Nose/Nasopharynx :  External Nose : External nose normal appearance, nares patent, no nasal discharge  Mouth and Throat :  General : Oral cavity appearance normal, breath odor normal  Lips : Appearance normal  NeckInspection and Palpation : Normal appearance without tenderness on palpation or deformities, trachea midline  Thyroid : Normal size without tenderness, nodules or masses  Jugular Veins : no JVD  ChestInspection of Chest : No lesions, deformities or traumatic injuries present. No significant scars are present.  Respiratory Effort : Breathing is unlabored without accessory muscle use  Palpation of Chest : Chest wall non-tender with no masses present. Tactile fremitus is normal  Auscultation : Normal breath sounds  CardiovascularHeart :  Auscultation : Heart rate is regular with normal rhythm. No murmurs are heard. No edema.  GastrointestinalAbdominal Exam : Abdomen soft without rigidity or guarding, abdomen non-tender to palpation, normal bowel sounds, no masses present, non-distended  Liver and Spleen : No hepatomegaly present. Liver is non-tender to palpation and spleen is not palpable.  LymphaticNeck : No lymphadenopathy present  Axillae : No lymphadenopathy present  Groin : No lymphadenopathy present  MusculoskeletalGait and Station : Normal Gait, normal station  Neck/Spine/Pelvis : No tenderness of deformities present.  SkinGeneral Inspection : No rashes, lesions or areas of discoloration present. Skin turgor is normal.  General Palpation : No abnormalities, masses or tenderness on palpation.  Neurologic and PsychiatricOrientation : Oriented to person, place and time  Sensation : Normal sensation  Memory : Short and long term memory intact.  Mood and Affect : Normal mood with an appropriate affect      Assessment  Ulcerative colitis     556.6  Hypoalbuminemia     273.8/E88.09  Rectal bleeding     569.3/K62.5  Ulcerative Colitis     556.0    Plan  OrdersPatient not identified as an unhealthy alcohol user when screene () - - 08/14/2019  Influenza immunization administered or previously received () - - 08/14/2019  BMI is documented within normal parameters and no follow-up plan () - - 08/14/2019  Current tobacco non-user (CAD, cap, COPD, PV) (dm) (IBD) (1036F, ) - - 08/14/2019  Colorectal cancer screening results documented and reviewed (PV) (3017F) - - 08/14/2019  Documentation of current medications. () - - 08/14/2019  CMP (comprehensive metabolic panel) (21138) - - 08/14/2019  Sed rate (39779) - - 08/14/2019  C-reactive protein (28645) - - 08/14/2019  CBC with diff (51213) - - 08/14/2019  Infliximab Concentration and Anti-Infliximab Antibody (65974) - - 08/14/2019  InstructionsI have documented a list of current medications and reviewed it with the patient.  I encouraged the patient to diet and exercise.  DispositionReturn To Clinic in 1 year    5 level 40 minute 80% counselling         Electronically Signed by: Uvaldo Meraz MD -Author on August 14, 2019 10:15:03 AM

## 2022-06-15 ENCOUNTER — TELEPHONE ENCOUNTER (OUTPATIENT)
Dept: URBAN - METROPOLITAN AREA CLINIC 98 | Facility: CLINIC | Age: 37
End: 2022-06-15

## 2022-06-20 ENCOUNTER — OFFICE VISIT (OUTPATIENT)
Dept: URBAN - METROPOLITAN AREA CLINIC 97 | Facility: CLINIC | Age: 37
End: 2022-06-20

## 2022-06-21 ENCOUNTER — TELEPHONE ENCOUNTER (OUTPATIENT)
Dept: URBAN - METROPOLITAN AREA CLINIC 98 | Facility: CLINIC | Age: 37
End: 2022-06-21

## 2022-06-25 PROBLEM — 15342002: Status: ACTIVE | Noted: 2021-09-30

## 2022-06-25 PROBLEM — 698065002 ACID REFLUX: Status: ACTIVE | Noted: 2022-06-25

## 2022-07-06 ENCOUNTER — OFFICE VISIT (OUTPATIENT)
Dept: URBAN - METROPOLITAN AREA CLINIC 97 | Facility: CLINIC | Age: 37
End: 2022-07-06
Payer: COMMERCIAL

## 2022-07-06 VITALS
TEMPERATURE: 97.2 F | BODY MASS INDEX: 21.97 KG/M2 | RESPIRATION RATE: 18 BRPM | DIASTOLIC BLOOD PRESSURE: 67 MMHG | WEIGHT: 140 LBS | SYSTOLIC BLOOD PRESSURE: 121 MMHG | HEART RATE: 67 BPM | HEIGHT: 67 IN

## 2022-07-06 DIAGNOSIS — K51.80 CHRONIC PANCOLONIC ULCERATIVE COLITIS: ICD-10-CM

## 2022-07-06 PROCEDURE — 96413 CHEMO IV INFUSION 1 HR: CPT | Performed by: INTERNAL MEDICINE

## 2022-07-06 RX ORDER — CALCIUM CARBONATE 500(1250)
1 TABLET WITH MEALS TABLET ORAL ONCE A DAY
Status: ACTIVE | COMMUNITY

## 2022-07-06 RX ORDER — INFLIXIMAB 100 MG/10ML
INFUSE 3 MG/KG OVER NO LESS THAN 2 HOUR(S) BY INTRAVENOUS ROUTE EVERY 8 WEEKS INJECTION, POWDER, LYOPHILIZED, FOR SOLUTION INTRAVENOUS
Qty: 1 | Refills: 0 | Status: ACTIVE | COMMUNITY
Start: 1900-01-01

## 2022-07-12 LAB
A/G RATIO: 2.3
ALBUMIN: 4.9
ALKALINE PHOSPHATASE: 43
ALT (SGPT): 21
ANTI-INFLIXIMAB ANTIBODY: <22
AST (SGOT): 24
BASO (ABSOLUTE): 0.1
BASOS: 1
BILIRUBIN, TOTAL: 0.9
BUN/CREATININE RATIO: 14
BUN: 14
C-REACTIVE PROTEIN, QUANT: <1
CALCIUM: 9.9
CALPROTECTIN, FECAL: 172
CARBON DIOXIDE, TOTAL: 26
CHLORIDE: 104
CREATININE: 1
EGFR: 101
EOS (ABSOLUTE): 0.1
EOS: 2
FERRITIN, SERUM: 50
FOLATE (FOLIC ACID), SERUM: >20
GLOBULIN, TOTAL: 2.1
GLUCOSE: 73
HBSAG SCREEN: NEGATIVE
HEMATOCRIT: 44.7
HEMATOLOGY COMMENTS:: (no result)
HEMOGLOBIN: 14.7
IMMATURE CELLS: (no result)
IMMATURE GRANS (ABS): 0
IMMATURE GRANULOCYTES: 0
INFLIXIMAB DRUG LEVEL: 12
IRON BIND.CAP.(TIBC): 264
IRON SATURATION: 47
IRON: 123
LACTOFERRIN, FECAL, QUANT.: 7.73
LDH: 158
LYMPHS (ABSOLUTE): 1.9
LYMPHS: 39
MCH: 30.6
MCHC: 32.9
MCV: 93
MONOCYTES(ABSOLUTE): 0.5
MONOCYTES: 10
NEUTROPHILS (ABSOLUTE): 2.4
NEUTROPHILS: 48
NRBC: (no result)
PHOSPHORUS: 3.3
PLATELETS: 250
POTASSIUM: 4.8
PROTEIN, TOTAL: 7
QUANTIFERON CRITERIA: (no result)
QUANTIFERON INCUBATION: (no result)
QUANTIFERON MITOGEN VALUE: >10
QUANTIFERON NIL VALUE: 0
QUANTIFERON TB1 AG VALUE: 0
QUANTIFERON TB2 AG VALUE: 0
QUANTIFERON-TB GOLD PLUS: NEGATIVE
RBC: 4.81
RDW: 11.8
SEDIMENTATION RATE-WESTERGREN: 2
SODIUM: 141
UIBC: 141
VITAMIN B12: 458
VITAMIN D, 25-HYDROXY: 49.6
WBC: 4.9

## 2022-07-13 ENCOUNTER — TELEPHONE ENCOUNTER (OUTPATIENT)
Dept: URBAN - METROPOLITAN AREA CLINIC 97 | Facility: CLINIC | Age: 37
End: 2022-07-13

## 2022-07-15 ENCOUNTER — TELEPHONE ENCOUNTER (OUTPATIENT)
Dept: URBAN - METROPOLITAN AREA CLINIC 92 | Facility: CLINIC | Age: 37
End: 2022-07-15

## 2022-08-02 ENCOUNTER — OFFICE VISIT (OUTPATIENT)
Dept: URBAN - METROPOLITAN AREA SURGERY CENTER 18 | Facility: SURGERY CENTER | Age: 37
End: 2022-08-02
Payer: COMMERCIAL

## 2022-08-02 ENCOUNTER — CLAIMS CREATED FROM THE CLAIM WINDOW (OUTPATIENT)
Dept: URBAN - METROPOLITAN AREA CLINIC 4 | Facility: CLINIC | Age: 37
End: 2022-08-02
Payer: COMMERCIAL

## 2022-08-02 DIAGNOSIS — K51.00 ACUTE ULCERATIVE PANCOLITIS: ICD-10-CM

## 2022-08-02 DIAGNOSIS — K63.89 OTHER SPECIFIED DISEASES OF INTESTINE: ICD-10-CM

## 2022-08-02 DIAGNOSIS — K21.9 GASTRO-ESOPHAGEAL REFLUX DISEASE WITHOUT ESOPHAGITIS: ICD-10-CM

## 2022-08-02 DIAGNOSIS — K51.40 INFLAMMATORY POLYP OF COLON: ICD-10-CM

## 2022-08-02 DIAGNOSIS — K21.9 ACID REFLUX: ICD-10-CM

## 2022-08-02 DIAGNOSIS — K31.89 OTHER DISEASES OF STOMACH AND DUODENUM: ICD-10-CM

## 2022-08-02 PROCEDURE — 88305 TISSUE EXAM BY PATHOLOGIST: CPT | Performed by: PATHOLOGY

## 2022-08-02 PROCEDURE — G8907 PT DOC NO EVENTS ON DISCHARG: HCPCS | Performed by: INTERNAL MEDICINE

## 2022-08-02 PROCEDURE — 45380 COLONOSCOPY AND BIOPSY: CPT | Performed by: INTERNAL MEDICINE

## 2022-08-02 PROCEDURE — 43239 EGD BIOPSY SINGLE/MULTIPLE: CPT | Performed by: INTERNAL MEDICINE

## 2022-08-02 PROCEDURE — 88312 SPECIAL STAINS GROUP 1: CPT | Performed by: PATHOLOGY

## 2022-08-02 RX ORDER — CALCIUM CARBONATE 500(1250)
1 TABLET WITH MEALS TABLET ORAL ONCE A DAY
Status: ACTIVE | COMMUNITY

## 2022-08-02 RX ORDER — INFLIXIMAB 100 MG/10ML
INFUSE 3 MG/KG OVER NO LESS THAN 2 HOUR(S) BY INTRAVENOUS ROUTE EVERY 8 WEEKS INJECTION, POWDER, LYOPHILIZED, FOR SOLUTION INTRAVENOUS
Qty: 1 | Refills: 0 | Status: ACTIVE | COMMUNITY
Start: 1900-01-01

## 2022-08-15 ENCOUNTER — OFFICE VISIT (OUTPATIENT)
Dept: URBAN - METROPOLITAN AREA CLINIC 97 | Facility: CLINIC | Age: 37
End: 2022-08-15

## 2022-08-25 ENCOUNTER — TELEPHONE ENCOUNTER (OUTPATIENT)
Dept: URBAN - METROPOLITAN AREA CLINIC 97 | Facility: CLINIC | Age: 37
End: 2022-08-25

## 2022-08-31 ENCOUNTER — OFFICE VISIT (OUTPATIENT)
Dept: URBAN - METROPOLITAN AREA CLINIC 97 | Facility: CLINIC | Age: 37
End: 2022-08-31
Payer: COMMERCIAL

## 2022-08-31 VITALS
WEIGHT: 171 LBS | SYSTOLIC BLOOD PRESSURE: 127 MMHG | DIASTOLIC BLOOD PRESSURE: 80 MMHG | HEART RATE: 77 BPM | RESPIRATION RATE: 20 BRPM | BODY MASS INDEX: 26.84 KG/M2 | TEMPERATURE: 98.3 F | HEIGHT: 67 IN

## 2022-08-31 DIAGNOSIS — K51.90 ACUTE ULCERATIVE COLITIS: ICD-10-CM

## 2022-08-31 PROCEDURE — 96413 CHEMO IV INFUSION 1 HR: CPT | Performed by: INTERNAL MEDICINE

## 2022-08-31 RX ORDER — CALCIUM CARBONATE 500(1250)
1 TABLET WITH MEALS TABLET ORAL ONCE A DAY
Status: ACTIVE | COMMUNITY

## 2022-08-31 RX ORDER — INFLIXIMAB 100 MG/10ML
INFUSE 3 MG/KG OVER NO LESS THAN 2 HOUR(S) BY INTRAVENOUS ROUTE EVERY 8 WEEKS INJECTION, POWDER, LYOPHILIZED, FOR SOLUTION INTRAVENOUS
Qty: 1 | Refills: 0 | Status: ACTIVE | COMMUNITY
Start: 1900-01-01

## 2022-10-26 ENCOUNTER — OFFICE VISIT (OUTPATIENT)
Dept: URBAN - METROPOLITAN AREA CLINIC 97 | Facility: CLINIC | Age: 37
End: 2022-10-26

## 2023-02-14 NOTE — PHYSICAL EXAM LYMPHATIC:
Neck, no lymphadenopathy Phone message left regarding scheduled upcoming GI procedure.     Place of procedure: [facility]  Arrival Time: 0715  Procedure Time:0830  Prep Type: miralax prep

## 2023-06-02 ENCOUNTER — OFFICE VISIT (OUTPATIENT)
Dept: URBAN - METROPOLITAN AREA CLINIC 98 | Facility: CLINIC | Age: 38
End: 2023-06-02

## 2023-06-02 VITALS — BODY MASS INDEX: 26.84 KG/M2 | WEIGHT: 171 LBS | HEIGHT: 67 IN

## 2023-06-02 RX ORDER — INFLIXIMAB 100 MG/10ML
INFUSE 3 MG/KG OVER NO LESS THAN 2 HOUR(S) BY INTRAVENOUS ROUTE EVERY 8 WEEKS INJECTION, POWDER, LYOPHILIZED, FOR SOLUTION INTRAVENOUS
Qty: 1 | Refills: 0 | Status: ACTIVE | COMMUNITY
Start: 1900-01-01

## 2023-06-02 RX ORDER — CALCIUM CARBONATE 500(1250)
1 TABLET WITH MEALS TABLET ORAL ONCE A DAY
Status: ACTIVE | COMMUNITY

## 2023-06-23 ENCOUNTER — LAB OUTSIDE AN ENCOUNTER (OUTPATIENT)
Dept: URBAN - METROPOLITAN AREA TELEHEALTH 2 | Facility: TELEHEALTH | Age: 38
End: 2023-06-23

## 2023-06-23 ENCOUNTER — OFFICE VISIT (OUTPATIENT)
Dept: URBAN - METROPOLITAN AREA TELEHEALTH 2 | Facility: TELEHEALTH | Age: 38
End: 2023-06-23
Payer: COMMERCIAL

## 2023-06-23 VITALS — WEIGHT: 171 LBS | HEIGHT: 67 IN | BODY MASS INDEX: 26.84 KG/M2

## 2023-06-23 DIAGNOSIS — K51.018 CHRONIC ULCERATIVE ENTEROCOLITIS WITH OTHER COMPLICATION: ICD-10-CM

## 2023-06-23 DIAGNOSIS — R19.7 ACUTE DIARRHEA: ICD-10-CM

## 2023-06-23 DIAGNOSIS — R10.84 ABDOMINAL CRAMPING, GENERALIZED: ICD-10-CM

## 2023-06-23 PROBLEM — 235595009: Status: ACTIVE | Noted: 2023-06-23

## 2023-06-23 PROCEDURE — 99215 OFFICE O/P EST HI 40 MIN: CPT | Performed by: INTERNAL MEDICINE

## 2023-06-23 RX ORDER — CALCIUM CARBONATE 500(1250)
1 TABLET WITH MEALS TABLET ORAL ONCE A DAY
Status: ACTIVE | COMMUNITY

## 2023-06-23 RX ORDER — INFLIXIMAB 100 MG/10ML
INFUSE 3 MG/KG OVER NO LESS THAN 2 HOUR(S) BY INTRAVENOUS ROUTE EVERY 8 WEEKS INJECTION, POWDER, LYOPHILIZED, FOR SOLUTION INTRAVENOUS
Qty: 1 | Refills: 0 | Status: ACTIVE | COMMUNITY
Start: 1900-01-01

## 2023-06-23 NOTE — HPI-TODAY'S VISIT:
39 yo male with UC returns for 12 month f//u TH visit. Not felling as well as in the past.  Belching increased tho no dysphagia. Belching is for frequent with many frequent burps - minor and it happens. EGD on 8/2/22 was nl and bx all nl ex features of reflux. Will offer 8 week course of omeprazole.  re: UC, has had 10 months ago, was switched from Remicade to Inflectra Infusions moved to [place] and past 5 infusions, every 8 weeks, and he has not felt as well. He feels more drained and his fatigue and blood in stool 2 weeks before infusion. 1.  More fatigue after infusion. 2. [place] is moving him back to Remicade and I agree. Switch back to Remicade. Was supposed to get infusion today. But delayed due to lack of ov and switch to Remicade, etc. 3. Recurrence of symptoms before next infusion has occurred more beforen next ifnusion. Now, it takes longer for the inflectra to kick in, increased syx for 2 weeks before infletra and for 1 weekd after.  Yesterday, over past week 3-4 stools a day and now getting up to 6-8 stools a day Last week, blood, Abdominal pain after the last infusion. Discomfort escalated to pain      ====================================================== 6/8/22  38 yo male returns for 6 month f/u visit.  Doing well. No pain diarrhea or bleeddng, except some stress with lack of sleep and normalized. Typically 3-5 stools a day.  No other medical issues.  Overuse joint issues, no others.  Nazia good with job. 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  ===Timothy got both doses of Pfizer vax in [place] and got boosted Oct 2021 Needs second booster now. Get another in 4-6 months. Lacto and calpro - normalized after a couople adddiotnal cycles of remicade and got back on schedule.  Burps every day.  when hungry. Very common. Does get chest pain and when he burps, it goes away.      ========================= 12/13/21  35 yo male comes in with Melrober. Recent stool tests showed inflammation. Started a job, [employer].  No syx. lacto/calpro were eleated at 18.6 and 186.  Vernon level at 4 weeks was 14 on 10/10/21 with next dose on 11/5  Interval was longer maybe 12 weeks when he came back from [facility] and got scheduled late so let check labs and lacto/calpro in 2 cycles more  ============================= 9/30/21  35 yo male with UC on Remicade 5 vials, Vernon brand, getting 5 vials q 8 weeks. Was seeing Dr. Phelan Last infusion was July 28, 9 weeks ago. Infusions went fine.  No pain diarrea or bleeding EIM none  Dates of pfizer vaccine - second dose Feb. Third dose today.  No primary MD  No other medical problems.  Takes zyrtec pre -Vernon Vit D Calcium  Ins [employer]   Demian working at [employer] ---- was [facility] [facility] [employer]     ============================ 8/14/19  Follow-up UC    History Of Present Illness  This is a scheduled appointment for this patient, a 34 year old /White male, after a previous visit on 08/14/2019, for a follow-up evaluation of Ulcerative colitis.     35 yo male comes in for f'u\ Last seen 6/14  Wife Demian Darling and all leave for 1 year [facility] 9/18 or so  Last Vernon level nwas 7/30 and was a 10 week interval------7.0  Prior infusion was 5/21  will check next level before departure for [place] 9/12 and departs 9/18.  Next infusion 9/12 and will be 6 week interval  No pain diarrhea bleeding. 3-5 formed stools a day.  Labs from 6/14 all wwnl.   Last dose of aza was June 14  Got Anser test denial by [employer], as ususual  Last colonoscopyu 2/26/19 near deep remission  Ame Huerta MD at [facility]  ==== 6/14/19 35 yo male with UC comes in for 3 month f/u  Getting  5 mg/kg q 8 weeks after haing high dose 10 mg/kg q 4 and then de-escalate.  Now on aza 50 mg a day, down from 4 per day in years past.  No pain diarrhea or bleeding.  Will stop aza 50 mg and get 6TG level today.  Get vernon level at next trough.  see back in 2 months.  do colonoscopy late aug or sept well before departure.  Plan--- try stopping aza get level get trought ifx at next trough, on current 5mg/kg  Leaving for [place] 9/19 f/u 2 months ck labs do colonoscopy  hopefully send on IFX monotherapy.     ============== 3/11/19  35 yo male with UC comes in for f/u.  Had  colonoscopy 2/26/18 and it showed no active inflamation and normal TI.  There were diffuse pseudopolyps.  No active UC syx, but GI syx increased withj Melech associated loss of sleep.  Has been taking Canasa for the past few months and anorectal syx/bleeding resolved.  Colon and bx, including the distal rectum  He can use Canasa prn and tub bath and consider Crohns if continued syx but likely not.  Considering an extended trip to [place] this summer.  Current insurance is [employer] thru [employer].  When they called about getting Remicade in [place], they were told it was prohibitive.  Last DEXA??8/14/18 showed osteopenia.  Taking caltrate with D. No h/o kidney stones.   Last eye exam ----??   ================= 11/14/18  34 yo male with severe UC doing miuch better  3 stools a day blood from hemorrhoids or anorectum. No rectal or abd pain..  Has not used Canasa yet.  Review of labs from Oct 9 shows low alb of 2.6 and mild elevation of calpro at 250 and lacto at 33.  He does not think he was doing better on Vernon every 4 weeks but backed off due to cesar trought level 3/12/18 of 45.6 and prior low level of 9.4 in 10/30/17  Baby melech 6'13oz and he is doing great.    doing well the past few months.   taking caltrate with D.   ============  34 yo male. Syx are stable. No pain diarrhea or bleeding except once a week or so. EIM- Saw Dr. Matthew for rash and dryness and hair falling out.  She said it was all autoimmune Clobetasol ointment for scalp Alclometasone cream for face. Hair has not regrown.  Asymmetric right post auricular.  Rectal bleeding is daily. 2-5 stools a day. Blood on wiping and not in the stool. Dexa scan done---showed osteopenia x 2 sites and normal at 1. No os Suggest caltrate with d bid.   Energy is good when he exercise and gets sleep. Weight training.  1/23/18 colonoscopy showed redness wqhich is inavtive UC as all bx were nl.  Quiescent. Willl repaet Jan or earlier.  6/15/18 calpro and lacto was 3x elevated so inflammation not fully controlled tho esr and crp were nl.  Current Vernon is 10 mg/kg = 700 mg every 8 weeks.  May need to shorten interval and will get TDM before next dose on Oct 9.  ========= 6/13/18 ov 34 yo male with UC dx 2015 comes in for 4 month f/u and is still doing very well.  No pain, diarrhea, or bleeding but gets these syx once a week.  AGWS.  EIM none.  Getting Remicade 10 mg/kg and "dandruff" and had it 6 months ago but did not mention.  Wife is pregnant.  No concerns.  Had IFX level of  45 on 10mg /kg q 4  3/18 4 week trough and prior level when inflamed was 9.  Advised reducing to 10 mg/kg q 8 and in process.  Had hi calpro      Past Medical History  Disease Name Date Onset Notes  Hypoalbuminemia 11/14/2018 --   IBS (irritable bowel syndrome) unk 06/01/2016 - 05/03/2016 - 01/20/2016 -   Rectal bleeding 09/14/2018 --   Ulcerative Colitis unk 07/01/2016 - 06/01/2016 - 05/03/2016 - 02/08/2016 - 01/20/2016 - 01/12/2016 - lsh 09/17/2015 -   Ulcerative colitis 06/14/2019 --       Past Surgical History  Procedure Name Date Notes  Clavicle surgery unk 07/01/2016 - miniscusal 06/01/2016 - 06/01/2016 - unk   Colonoscopy 2/26/2019 03/11/2019 - 11/14/2018 - 09/14/2018 - 06/13/2018 - 02/12/2018 -       Medication List  Name Date Started Instructions  Remicade 100 mg intravenous recon soln  infuse 3 mg/kg over no less than 2 hour(s) by intravenous route every 8 weeks  Vitamin D3 5,000 unit oral tablet  --       Allergy List  Allergen Name Date Reaction Notes  No Known Environmental Allergies --  --  06/01/2016 - 05/03/2016 - 01/12/2016 - [facility] 10/28/2015 - 10/07/2015 - 09/17/2015 -   No Known Food Allergies --  --  06/01/2016 - 05/03/2016 - 01/12/2016 - [facility] 10/28/2015 - 10/07/2015 - 09/17/2015 -   other 7/27/2016 --  07/27/2016 - [facility]       Family Medical History  Disease Name Relative/Age Notes  Family history of Crohn's disease Brother/17  6/1/2016      Social History  Finding Status Start/Stop Quantity Notes  Alcohol Current some day --/-- --  08/14/2019 - 01/12/2017 - 11/10/2016 - 06/01/2016 - 05/03/2016 - 02/08/2016 - 01/20/2016 - 01/12/2016 - [facility] 10/28/2015 - 10/07/2015 - 09/17/2015 - 09/02/2015 - Once per week  Denies illicit substance abuse --  --/-- --  08/14/2019 - 03/11/2019 -   Tobacco Never --/-- --  06/14/2019 - 03/11/2019 - 11/14/2018 - 09/14/2018 - 06/13/2018 - 02/12/2018 - 10/20/2017 - 06/07/2017 - 03/15/2017 - 02/20/2017 - 01/12/2017 - 12/05/2016 -       Review of Systems  ConstitutionalDenies : body aches, chills, fatigue, fever, loss of appetite, malaise, night sweats, weight gain, weight loss  EyesDenies : blurred vision, visual changes  HENTDenies : Ear Pain/Ringing, hearing loss, Mouth Ulcers/Sores, nose bleeds, problems with gums/teeth, trouble swallowing  CardiovascularDenies : chest pain, leaky heart valves, heart murmur, heart racing/skipping, high blood pressure, palpitations  RespiratoryDenies : chronic cough, shortness of breath, wheezing or asthma symptoms  GastrointestinalDenies : Abdominal Pain/discomfort, Anal/Rectal Pain or Itching, Anal Spasm, Black Stool, Bloating/belching/gaseouness, Change of Bowel Habit, Constipation, Diarrhea/Loose Stool, Difficulty in Swallowing, Heartburn/esophageal reflux, Hemorrhoids, Indigestion, Mucus in Stool, Nausea/vomiting, Rectal Bleeding, Unintentional Weight Loss  GenitourinaryDenies : Blood in Urine, Burning/pain with Urination, frequency, Recent/frequent UTI, Kidney Stones  IntegumentDenies : itching/dry skin, jaundice, rashes, bumps or sores  NeurologicDenies : headaches, dizziness/vertigo, head trauma/injury, recent numbness/weakness, seizures  MusculoskeletalDenies : back pain, decreased range of motion, joint pain/arthritis, Problems Walking/calf or leg pain  EndocrineDenies : bruise easily, excessive thirst, heat/cold intolerance, history of high or low blood sugar  PsychiatricDenies : anxiety, changes in sleep pattern, depression, loss of memory  Heme-LymphDenies : Bleeding Problems, Enlarged Nodes/Swolen Glands, excessive bruising, history of anemia  Allergic-ImmunologicDenies : seasonal allergies    Vitals  Date Time BP Position Site L\R Cuff Size HR RR TEMP (F) WT  HT  BMI kg/m2 BSA m2 O2 Sat      08/14/2019 09:32 /70 Sitting    60 - R  97.9 136lbs 8oz 5'  7" 21.38 1.71        Physical Examination  ConstitutionalAppearance : Well nourished, well developed patient in no distress. Ambulating without difficulty.  Ability to Communicate : Normal communication ability  EyesConjunctivae and Eyelids : Conjunctivae and eyelids appear normal  Sclerae : White without injection  HENTHead :  Inspection : Normocephalic and atraumatic  Face :  Inspection : Face within normal limits  Ears :  External Ears : External ears within normal limits  Nose/Nasopharynx :  External Nose : External nose normal appearance, nares patent, no nasal discharge  Mouth and Throat :  General : Oral cavity appearance normal, breath odor normal  Lips : Appearance normal  NeckInspection and Palpation : Normal appearance without tenderness on palpation or deformities, trachea midline  Thyroid : Normal size without tenderness, nodules or masses  Jugular Veins : no JVD  ChestInspection of Chest : No lesions, deformities or traumatic injuries present. No significant scars are present.  Respiratory Effort : Breathing is unlabored without accessory muscle use  Palpation of Chest : Chest wall non-tender with no masses present. Tactile fremitus is normal  Auscultation : Normal breath sounds  CardiovascularHeart :  Auscultation : Heart rate is regular with normal rhythm. No murmurs are heard. No edema.  GastrointestinalAbdominal Exam : Abdomen soft without rigidity or guarding, abdomen non-tender to palpation, normal bowel sounds, no masses present, non-distended  Liver and Spleen : No hepatomegaly present. Liver is non-tender to palpation and spleen is not palpable.  LymphaticNeck : No lymphadenopathy present  Axillae : No lymphadenopathy present  Groin : No lymphadenopathy present  MusculoskeletalGait and Station : Normal Gait, normal station  Neck/Spine/Pelvis : No tenderness of deformities present.  SkinGeneral Inspection : No rashes, lesions or areas of discoloration present. Skin turgor is normal.  General Palpation : No abnormalities, masses or tenderness on palpation.  Neurologic and PsychiatricOrientation : Oriented to person, place and time  Sensation : Normal sensation  Memory : Short and long term memory intact.  Mood and Affect : Normal mood with an appropriate affect      Assessment  Ulcerative colitis     556.6  Hypoalbuminemia     273.8/E88.09  Rectal bleeding     569.3/K62.5  Ulcerative Colitis     556.0    Plan  OrdersPatient not identified as an unhealthy alcohol user when screene () - - 08/14/2019  Influenza immunization administered or previously received () - - 08/14/2019  BMI is documented within normal parameters and no follow-up plan () - - 08/14/2019  Current tobacco non-user (CAD, cap, COPD, PV) (dm) (IBD) (1036F, ) - - 08/14/2019  Colorectal cancer screening results documented and reviewed (PV) (3017F) - - 08/14/2019  Documentation of current medications. () - - 08/14/2019  CMP (comprehensive metabolic panel) (95037) - - 08/14/2019  Sed rate (69257) - - 08/14/2019  C-reactive protein (40996) - - 08/14/2019  CBC with diff (47257) - - 08/14/2019  Infliximab Concentration and Anti-Infliximab Antibody (00298) - - 08/14/2019  InstructionsI have documented a list of current medications and reviewed it with the patient.  I encouraged the patient to diet and exercise.  DispositionReturn To Clinic in 1 year    5 level 40 minute 80% counselling         Electronically Signed by: Uvaldo Meraz MD -Author on August 14, 2019 10:15

## 2023-07-07 LAB
A/G RATIO: 2
ALBUMIN: 4.6
ALKALINE PHOSPHATASE: 47
ALT (SGPT): 21
ANTI-INFLIXIMAB ANTIBODY: 36
AST (SGOT): 29
BASO (ABSOLUTE): 0
BASOS: 0
BILIRUBIN, TOTAL: 0.8
BUN/CREATININE RATIO: 13
BUN: 13
C-REACTIVE PROTEIN, QUANT: <1
CALCIUM: 9.5
CARBON DIOXIDE, TOTAL: 25
CHLORIDE: 101
CREATININE: 1.01
EGFR: 99
EOS (ABSOLUTE): 0.1
EOS: 1
FERRITIN, SERUM: 40
FOLATE (FOLIC ACID), SERUM: >20
GLOBULIN, TOTAL: 2.3
GLUCOSE: 108
HEMATOCRIT: 41.8
HEMATOLOGY COMMENTS:: (no result)
HEMOGLOBIN: 14.2
IMMATURE CELLS: (no result)
IMMATURE GRANS (ABS): 0
IMMATURE GRANULOCYTES: 0
INFLIXIMAB DRUG LEVEL: 4.9
IRON BIND.CAP.(TIBC): 275
IRON SATURATION: 26
IRON: 71
LYMPHS (ABSOLUTE): 2
LYMPHS: 30
Lab: (no result)
MCH: 31.8
MCHC: 34
MCV: 94
MONOCYTES(ABSOLUTE): 0.5
MONOCYTES: 7
NEUTROPHILS (ABSOLUTE): 4.2
NEUTROPHILS: 62
NRBC: (no result)
PLATELETS: 209
POTASSIUM: 4.1
PROTEIN, TOTAL: 6.9
RBC: 4.47
RDW: 12.3
SEDIMENTATION RATE-WESTERGREN: 2
SODIUM: 139
UIBC: 204
VITAMIN B12: 415
VITAMIN D, 25-HYDROXY: 43.2
WBC: 6.8

## 2023-07-17 ENCOUNTER — LAB OUTSIDE AN ENCOUNTER (OUTPATIENT)
Dept: URBAN - METROPOLITAN AREA CLINIC 98 | Facility: CLINIC | Age: 38
End: 2023-07-17

## 2023-07-17 ENCOUNTER — TELEPHONE ENCOUNTER (OUTPATIENT)
Dept: URBAN - METROPOLITAN AREA CLINIC 98 | Facility: CLINIC | Age: 38
End: 2023-07-17

## 2023-07-26 LAB
C DIFFICILE TOXIN GENE NAA: NEGATIVE
C DIFFICILE TOXINS A+B, EIA: NEGATIVE
CALPROTECTIN, FECAL: 337
LACTOFERRIN, FECAL, QUANT.: 19.41

## 2023-08-07 ENCOUNTER — TELEPHONE ENCOUNTER (OUTPATIENT)
Dept: URBAN - METROPOLITAN AREA CLINIC 98 | Facility: CLINIC | Age: 38
End: 2023-08-07

## 2023-08-07 RX ORDER — INFLIXIMAB 100 MG/10ML
INFUSE 10 MG/KG INJECTION, POWDER, LYOPHILIZED, FOR SOLUTION INTRAVENOUS
Qty: 100 EACH | Refills: 18
Start: 2023-08-07 | End: 2026-07-06

## 2023-08-07 RX ORDER — INFLIXIMAB 100 MG/10ML
INFUSE 10 MG/KG INJECTION, POWDER, LYOPHILIZED, FOR SOLUTION INTRAVENOUS
Qty: 100 EACH | Refills: 18 | OUTPATIENT
Start: 2023-08-07 | End: 2026-07-06

## 2023-08-17 ENCOUNTER — TELEPHONE ENCOUNTER (OUTPATIENT)
Dept: URBAN - METROPOLITAN AREA CLINIC 98 | Facility: CLINIC | Age: 38
End: 2023-08-17

## 2023-08-17 RX ORDER — INFLIXIMAB 100 MG/10ML
10MG/KG INJECTION, POWDER, LYOPHILIZED, FOR SOLUTION INTRAVENOUS
Qty: 900 EACH | Refills: 8 | Status: DISCONTINUED | COMMUNITY
Start: 2023-08-17 | End: 2025-01-02

## 2023-08-17 RX ORDER — INFLIXIMAB 100 MG/10ML
INFUSE 10 MG/KG INJECTION, POWDER, LYOPHILIZED, FOR SOLUTION INTRAVENOUS
Qty: 100 EACH | Refills: 18 | Status: ACTIVE | COMMUNITY
Start: 2023-08-07 | End: 2026-07-06

## 2023-08-17 RX ORDER — CALCIUM CARBONATE 500(1250)
1 TABLET WITH MEALS TABLET ORAL ONCE A DAY
Status: ACTIVE | COMMUNITY

## 2023-08-17 RX ORDER — INFLIXIMAB 100 MG/10ML
INFUSE 3 MG/KG OVER NO LESS THAN 2 HOUR(S) BY INTRAVENOUS ROUTE EVERY 8 WEEKS INJECTION, POWDER, LYOPHILIZED, FOR SOLUTION INTRAVENOUS
Qty: 1 | Refills: 0 | Status: DISCONTINUED | COMMUNITY
Start: 1900-01-01

## 2023-08-17 RX ORDER — INFLIXIMAB 100 MG/10ML
10MG/KG INJECTION, POWDER, LYOPHILIZED, FOR SOLUTION INTRAVENOUS
Qty: 100 EACH | Refills: 8 | OUTPATIENT
Start: 2023-08-17 | End: 2025-01-01

## 2023-08-17 RX ORDER — INFLIXIMAB 100 MG/10ML
10MG/KG INJECTION, POWDER, LYOPHILIZED, FOR SOLUTION INTRAVENOUS
Qty: 900 EACH | Refills: 8
Start: 2023-08-17 | End: 2025-01-02

## 2023-08-17 RX ORDER — INFLIXIMAB 100 MG/10ML
INFUSE 10 MG/KG INJECTION, POWDER, LYOPHILIZED, FOR SOLUTION INTRAVENOUS
Qty: 100 EACH | Refills: 18
Start: 2023-08-07 | End: 2026-07-16

## 2023-08-28 ENCOUNTER — TELEPHONE ENCOUNTER (OUTPATIENT)
Dept: URBAN - METROPOLITAN AREA CLINIC 97 | Facility: CLINIC | Age: 38
End: 2023-08-28

## 2023-10-03 ENCOUNTER — TELEPHONE ENCOUNTER (OUTPATIENT)
Dept: URBAN - METROPOLITAN AREA CLINIC 98 | Facility: CLINIC | Age: 38
End: 2023-10-03

## 2023-10-03 RX ORDER — INFLIXIMAB 100 MG/10ML
INFUSE 10 MG/KG INJECTION, POWDER, LYOPHILIZED, FOR SOLUTION INTRAVENOUS
Qty: 7 EACH | Refills: 18
Start: 2023-08-07 | End: 2026-09-01

## 2023-10-17 ENCOUNTER — OFFICE VISIT (OUTPATIENT)
Dept: URBAN - METROPOLITAN AREA SURGERY CENTER 18 | Facility: SURGERY CENTER | Age: 38
End: 2023-10-17
Payer: COMMERCIAL

## 2023-10-17 ENCOUNTER — TELEPHONE ENCOUNTER (OUTPATIENT)
Dept: URBAN - METROPOLITAN AREA CLINIC 98 | Facility: CLINIC | Age: 38
End: 2023-10-17

## 2023-10-17 ENCOUNTER — CLAIMS CREATED FROM THE CLAIM WINDOW (OUTPATIENT)
Dept: URBAN - METROPOLITAN AREA CLINIC 4 | Facility: CLINIC | Age: 38
End: 2023-10-17
Payer: COMMERCIAL

## 2023-10-17 DIAGNOSIS — K55.20 ACQUIRED ARTERIOVENOUS MALFORMATION OF COLON: ICD-10-CM

## 2023-10-17 DIAGNOSIS — K63.89 OTHER SPECIFIED DISEASES OF INTESTINE: ICD-10-CM

## 2023-10-17 DIAGNOSIS — Z12.11 COLON CANCER SCREENING (HIGH RISK): ICD-10-CM

## 2023-10-17 DIAGNOSIS — K51.00 ACUTE ULCERATIVE PANCOLITIS: ICD-10-CM

## 2023-10-17 DIAGNOSIS — K51.00 ULCERATIVE PAN COLITIS: ICD-10-CM

## 2023-10-17 DIAGNOSIS — K51.40 PSEUDOPOLYPOSIS OF COLON: ICD-10-CM

## 2023-10-17 DIAGNOSIS — Q28.2 ARTERIOVENOUS MALFORMATION OF CEREBRAL VESSELS: ICD-10-CM

## 2023-10-17 DIAGNOSIS — K63.5 BENIGN COLON POLYP: ICD-10-CM

## 2023-10-17 PROCEDURE — 45380 COLONOSCOPY AND BIOPSY: CPT | Performed by: INTERNAL MEDICINE

## 2023-10-17 PROCEDURE — 00811 ANES LWR INTST NDSC NOS: CPT | Performed by: NURSE ANESTHETIST, CERTIFIED REGISTERED

## 2023-10-17 PROCEDURE — 88342 IMHCHEM/IMCYTCHM 1ST ANTB: CPT | Performed by: PATHOLOGY

## 2023-10-17 PROCEDURE — 88305 TISSUE EXAM BY PATHOLOGIST: CPT | Performed by: PATHOLOGY

## 2023-10-17 PROCEDURE — 88341 IMHCHEM/IMCYTCHM EA ADD ANTB: CPT | Performed by: PATHOLOGY

## 2023-10-17 PROCEDURE — G8907 PT DOC NO EVENTS ON DISCHARG: HCPCS | Performed by: INTERNAL MEDICINE

## 2023-10-17 RX ORDER — CALCIUM CARBONATE 500(1250)
1 TABLET WITH MEALS TABLET ORAL ONCE A DAY
Status: ACTIVE | COMMUNITY

## 2023-10-17 RX ORDER — INFLIXIMAB 100 MG/10ML
INFUSE 10 MG/KG INJECTION, POWDER, LYOPHILIZED, FOR SOLUTION INTRAVENOUS
Qty: 7 EACH | Refills: 18 | Status: ACTIVE | COMMUNITY
Start: 2023-08-07 | End: 2026-09-01

## 2023-11-09 ENCOUNTER — CLAIMS CREATED FROM THE CLAIM WINDOW (OUTPATIENT)
Dept: URBAN - METROPOLITAN AREA CLINIC 98 | Facility: CLINIC | Age: 38
End: 2023-11-09
Payer: COMMERCIAL

## 2023-11-09 VITALS
DIASTOLIC BLOOD PRESSURE: 80 MMHG | TEMPERATURE: 97.3 F | WEIGHT: 142 LBS | SYSTOLIC BLOOD PRESSURE: 142 MMHG | HEIGHT: 67 IN | BODY MASS INDEX: 22.29 KG/M2 | HEART RATE: 65 BPM

## 2023-11-09 DIAGNOSIS — K21.9 CHRONIC GERD: ICD-10-CM

## 2023-11-09 DIAGNOSIS — K21.9 ACID REFLUX: ICD-10-CM

## 2023-11-09 DIAGNOSIS — E88.09 HYPOALBUMINEMIA: ICD-10-CM

## 2023-11-09 DIAGNOSIS — K51.018 CHRONIC ULCERATIVE ENTEROCOLITIS WITH OTHER COMPLICATION: ICD-10-CM

## 2023-11-09 DIAGNOSIS — K62.5 RECTAL BLEEDING: ICD-10-CM

## 2023-11-09 PROCEDURE — 99215 OFFICE O/P EST HI 40 MIN: CPT | Performed by: INTERNAL MEDICINE

## 2023-11-09 RX ORDER — INFLIXIMAB 100 MG/10ML
INFUSE 10 MG/KG INJECTION, POWDER, LYOPHILIZED, FOR SOLUTION INTRAVENOUS
Qty: 7 EACH | Refills: 18 | Status: ACTIVE | COMMUNITY
Start: 2023-08-07 | End: 2026-09-01

## 2023-11-09 RX ORDER — CALCIUM CARBONATE 500(1250)
1 TABLET WITH MEALS TABLET ORAL ONCE A DAY
Status: ACTIVE | COMMUNITY

## 2023-11-09 NOTE — HPI-TODAY'S VISIT:
39 yo male with Cd comes in for interval f/u. Has not gottent the dose increase yet.  Last IFX level July - IFX4.6 and ATI-36  Got 6 vials last time rather than 7 vials. Should be [id number] 10 mg/kg q 8 weeks. will recheck TDM today  Recent colonoscopy and bx showed no inflammation - one unusual pseudopolyps bx report - of no issue. ============================== 6/23/23  39 yo male with UC returns for 12 month f//u TH visit. Not felling as well as in the past.  Belching increased tho no dysphagia. Belching is for frequent with many frequent burps - minor and it happens. EGD on 8/2/22 was nl and bx all nl ex features of reflux. Will offer 8 week course of omeprazole.  re: UC, has had 10 months ago, was switched from Remicade to Inflectra Infusions moved to [place] and past 5 infusions, every 8 weeks, and he has not felt as well. He feels more drained and his fatigue and blood in stool 2 weeks before infusion. 1. More fatigue after infusion. 2. [place] is moving him back to Remicade and I agree. Switch back to Remicade. Was supposed to get infusion today. But delayed due to lack of ov and switch to Remicade, etc. 3. Recurrence of symptoms before next infusion has occurred more beforen next ifnusion. Now, it takes longer for the inflectra to kick in, increased syx for 2 weeks before infletra and for 1 weekd after.  Yesterday, over past week 3-4 stools a day and now getting up to 6-8 stools a day Last week, blood, Abdominal pain after the last infusion. Discomfort escalated to pain      ====================================================== 6/8/22  38 yo male returns for 6 month f/u visit.  Doing well. No pain diarrhea or bleeddng, except some stress with lack of sleep and normalized. Typically 3-5 stools a day.  No other medical issues.  Overuse joint issues, no others.  Nazia good with job. 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  ===Timothy got both doses of Pfizer vax in [place] and got boosted Oct 2021 Needs second booster now. Get another in 4-6 months. Lacto and calpro - normalized after a couople adddiotnal cycles of remicade and got back on schedule.  Burps every day. when hungry. Very common. Does get chest pain and when he burps, it goes away.      ========================= 12/13/21  35 yo male comes in with Darcy. Recent stool tests showed inflammation. Started a job, [employer].  No syx. lacto/calpro were eleated at 18.6 and 186.  Vernon level at 4 weeks was 14 on 10/10/21 with next dose on 11/5  Interval was longer maybe 12 weeks when he came back from [place] and got scheduled late so let check labs and lacto/calpro in 2 cycles more  ============================= 9/30/21  35 yo male with UC on Remicade 5 vials, Vernon brand, getting 5 vials q 8 weeks. Was seeing Dr. Phelan Last infusion was July 28, 9 weeks ago. Infusions went fine.  No pain diarrea or bleeding EIM none  Dates of pfizer vaccine - second dose Feb. Third dose today.  No primary MD  No other medical problems.  Takes zyrtec pre -Vernon Vit D Calcium  Ins [employer] Arciniega working at [employer] ---- was [facility]     ============================ 8/14/19  Follow-up UC   History Of Present Illness This is a scheduled appointment for this patient, a 34 year old /White male, after a previous visit on 08/14/2019, for a follow-up evaluation of Ulcerative colitis.   35 yo male comes in for f'u\ Last seen 6/14  Wife Demian Darling and all leave for 1 year [facility] 9/18 or so  Last Vernon level nwas 7/30 and was a 10 week interval------7.0  Prior infusion was 5/21  will check next level before departure for [place] 9/12 and departs 9/18.  Next infusion 9/12 and will be 6 week interval  No pain diarrhea bleeding. 3-5 formed stools a day.  Labs from 6/14 all wwnl.   Last dose of aza was June 14  Got Anser test denial by [employer], as ususual  Last colonoscopyu 2/26/19 near deep remission  Ame Huerta MD at [facility]  ==== 6/14/19 35 yo male with UC comes in for 3 month f/u  Getting 5 mg/kg q 8 weeks after haing high dose 10 mg/kg q 4 and then de-escalate.  Now on aza 50 mg a day, down from 4 per day in years past.  No pain diarrhea or bleeding.  Will stop aza 50 mg and get 6TG level today.  Get vernon level at next trough.  see back in 2 months.  do colonoscopy late aug or sept well before departure.  Plan--- try stopping aza get level get trought ifx at next trough, on current 5mg/kg  Leaving for [facility] 9/19 f/u 2 months ck labs do colonoscopy hopefully send on IFX monotherapy.     ============== 3/11/19  35 yo male with UC comes in for f/u.  Had colonoscopy 2/26/18 and it showed no active inflamation and normal TI.  There were diffuse pseudopolyps.  No active UC syx, but GI syx increased withj Melech associated loss of sleep.  Has been taking Canasa for the past few months and anorectal syx/bleeding resolved.  Colon and bx, including the distal rectum  He can use Canasa prn and tub bath and consider Crohns if continued syx but likely not.  Considering an extended trip to [place] this summer.  Current insurance is [employer] thru [employer].  When they called about getting Remicade in [place], they were told it was prohibitive.  Last DEXA??8/14/18 showed osteopenia.  Taking caltrate with D. No h/o kidney stones.   Last eye exam ----??   ================= 11/14/18  34 yo male with severe UC doing miuch better 3 stools a day blood from hemorrhoids or anorectum. No rectal or abd pain..  Has not used Canasa yet.  Review of labs from Oct 9 shows low alb of 2.6 and mild elevation of calpro at 250 and lacto at 33.  He does not think he was doing better on Vernon every 4 weeks but backed off due to cesar trought level 3/12/18 of 45.6 and prior low level of 9.4 in 10/30/17  Baby melech 6'13oz and he is doing great.    doing well the past few months.   taking caltrate with D.   ============  34 yo male. Syx are stable. No pain diarrhea or bleeding except once a week or so. EIM- Saw Dr. Matthew for rash and dryness and hair falling out. She said it was all autoimmune Clobetasol ointment for scalp Alclometasone cream for face. Hair has not regrown. Asymmetric right post auricular.  Rectal bleeding is daily. 2-5 stools a day. Blood on wiping and not in the stool. Dexa scan done---showed osteopenia x 2 sites and normal at 1. No os Suggest caltrate with d bid.   Energy is good when he exercise and gets sleep. Weight training.  1/23/18 colonoscopy showed redness wqhich is inavtive UC as all bx were nl. Quiescent. Willl repaet Jan or earlier.  6/15/18 calpro and lacto was 3x elevated so inflammation not fully controlled tho esr and crp were nl.  Current Vernon is 10 mg/kg = 700 mg every 8 weeks. May need to shorten interval and will get TDM before next dose on Oct 9.  ========= 6/13/18 ov 34 yo male with UC dx 2015 comes in for 4 month f/u and is still doing very well.  No pain, diarrhea, or bleeding but gets these syx once a week.  AGWS.  EIM none.  Getting Remicade 10 mg/kg and "dandruff" and had it 6 months ago but did not mention.  Wife is pregnant. No concerns.  Had IFX level of 45 on 10mg /kg q 4 3/18 4 week trough and prior level when inflamed was 9.  Advised reducing to 10 mg/kg q 8 and in process.  Had hi calpro     Past Medical History Disease Name Date Onset Notes Hypoalbuminemia 11/14/2018 -- IBS (irritable bowel syndrome) unk 06/01/2016 - 05/03/2016 - 01/20/2016 - Rectal bleeding 09/14/2018 -- Ulcerative Colitis unk 07/01/2016 - 06/01/2016 - 05/03/2016 - 02/08/2016 - 01/20/2016 - 01/12/2016 - [facility] 09/17/2015 - Ulcerative colitis 06/14/2019 --     Past Surgical History Procedure Name Date Notes Clavicle surgery unk 07/01/2016 - miniscusal 06/01/2016 - 06/01/2016 - unk Colonoscopy 2/26/2019 03/11/2019 - 11/14/2018 - 09/14/2018 - 06/13/2018 - 02/12/2018 -     Medication List Name Date Started Instructions Remicade 100 mg intravenous recon soln infuse 3 mg/kg over no less than 2 hour(s) by intravenous route every 8 weeks Vitamin D3 5,000 unit oral tablet --     Allergy List Allergen Name Date Reaction Notes No Known Environmental Allergies -- -- 06/01/2016 - 05/03/2016 - 01/12/2016 - [facility] 10/28/2015 - 10/07/2015 - 09/17/2015 - No Known Food Allergies -- -- 06/01/2016 - 05/03/2016 - 01/12/2016 - [facility] 10/28/2015 - 10/07/2015 - 09/17/2015 - other 7/27/2016 -- 07/27/2016 - ENTYVIO     Family Medical History Disease Name Relative/Age Notes Family history of Crohn's disease Brother/17 6/1/2016     Social History Finding Status Start/Stop Quantity Notes Alcohol Current some day --/-- -- 08/14/2019 - 01/12/2017 - 11/10/2016 - 06/01/2016 - 05/03/2016 - 02/08/2016 - 01/20/2016 - 01/12/2016 - [facility] 10/28/2015 - 10/07/2015 - 09/17/2015 - 09/02/2015 - Once per week Denies illicit substance abuse -- --/-- -- 08/14/2019 - 03/11/2019 - Tobacco Never --/-- -- 06/14/2019 - 03/11/2019 - 11/14/2018 - 09/14/2018 - 06/13/2018 - 02/12/2018 - 10/20/2017 - 06/07/2017 - 03/15/2017 - 02/20/2017 - 01/12/2017 - 12/05/2016 -     Review of Systems ConstitutionalDenies : body aches, chills, fatigue, fever, loss of appetite, malaise, night sweats, weight gain, weight loss EyesDenies : blurred vision, visual changes HENTDenies : Ear Pain/Ringing, hearing loss, Mouth Ulcers/Sores, nose bleeds, problems with gums/teeth, trouble swallowing CardiovascularDenies : chest pain, leaky heart valves, heart murmur, heart racing/skipping, high blood pressure, palpitations RespiratoryDenies : chronic cough, shortness of breath, wheezing or asthma symptoms GastrointestinalDenies : Abdominal Pain/discomfort, Anal/Rectal Pain or Itching, Anal Spasm, Black Stool, Bloating/belching/gaseouness, Change of Bowel Habit, Constipation, Diarrhea/Loose Stool, Difficulty in Swallowing, Heartburn/esophageal reflux, Hemorrhoids, Indigestion, Mucus in Stool, Nausea/vomiting, Rectal Bleeding, Unintentional Weight Loss GenitourinaryDenies : Blood in Urine, Burning/pain with Urination, frequency, Recent/frequent UTI, Kidney Stones IntegumentDenies : itching/dry skin, jaundice, rashes, bumps or sores NeurologicDenies : headaches, dizziness/vertigo, head trauma/injury, recent numbness/weakness, seizures MusculoskeletalDenies : back pain, decreased range of motion, joint pain/arthritis, Problems Walking/calf or leg pain EndocrineDenies : bruise easily, excessive thirst, heat/cold intolerance, history of high or low blood sugar PsychiatricDenies : anxiety, changes in sleep pattern, depression, loss of memory Heme-LymphDenies : Bleeding Problems, Enlarged Nodes/Swolen Glands, excessive bruising, history of anemia Allergic-ImmunologicDenies : seasonal allergies   Vitals Date Time BP Position Site L\R Cuff Size HR RR TEMP (F) WT HT BMI kg/m2 BSA m2 O2 Sat   08/14/2019 09:32 /70 Sitting 60 - R 97.9 136lbs 8oz 5' 7" 21.38 1.71     Physical Examination ConstitutionalAppearance : Well nourished, well developed patient in no distress. Ambulating without difficulty. Ability to Communicate : Normal communication ability EyesConjunctivae and Eyelids : Conjunctivae and eyelids appear normal Sclerae : White without injection HENTHead : Inspection : Normocephalic and atraumatic Face : Inspection : Face within normal limits Ears : External Ears : External ears within normal limits Nose/Nasopharynx : External Nose : External nose normal appearance, nares patent, no nasal discharge Mouth and Throat : General : Oral cavity appearance normal, breath odor normal Lips : Appearance normal NeckInspection and Palpation : Normal appearance without tenderness on palpation or deformities, trachea midline Thyroid : Normal size without tenderness, nodules or masses Jugular Veins : no JVD ChestInspection of Chest : No lesions, deformities or traumatic injuries present. No significant scars are present. Respiratory Effort : Breathing is unlabored without accessory muscle use Palpation of Chest : Chest wall non-tender with no masses present. Tactile fremitus is normal Auscultation : Normal breath sounds CardiovascularHeart : Auscultation : Heart rate is regular with normal rhythm. No murmurs are heard. No edema. GastrointestinalAbdominal Exam : Abdomen soft without rigidity or guarding, abdomen non-tender to palpation, normal bowel sounds, no masses present, non-distended Liver and Spleen : No hepatomegaly present. Liver is non-tender to palpation and spleen is not palpable. LymphaticNeck : No lymphadenopathy present Axillae : No lymphadenopathy present Groin : No lymphadenopathy present MusculoskeletalGait and Station : Normal Gait, normal station Neck/Spine/Pelvis : No tenderness of deformities present. SkinGeneral Inspection : No rashes, lesions or areas of discoloration present. Skin turgor is normal. General Palpation : No abnormalities, masses or tenderness on palpation. Neurologic and PsychiatricOrientation : Oriented to person, place and time Sensation : Normal sensation Memory : Short and long term memory intact. Mood and Affect : Normal mood with an appropriate affect     Assessment Ulcerative colitis 556.6  Hypoalbuminemia 273.8/E88.09  Rectal bleeding 569.3/K62.5  Ulcerative Colitis 556.0    Plan OrdersPatient not identified as an unhealthy alcohol user when screene () - - 08/14/2019 Influenza immunization administered or previously received () - - 08/14/2019 BMI is documented within normal parameters and no follow-up plan () - - 08/14/2019 Current tobacco non-user (CAD, cap, COPD, PV) (dm) (IBD) (1036F, ) - - 08/14/2019 Colorectal cancer screening results documented and reviewed (PV) (3017F) - - 08/14/2019 Documentation of current medications. () - - 08/14/2019 CMP (comprehensive metabolic panel) (44982) - - 08/14/2019 Sed rate (09130) - - 08/14/2019 C-reactive protein (30049) - - 08/14/2019 CBC with diff (66841) - - 08/14/2019 Infliximab Concentration and Anti-Infliximab Antibody (45522) - - 08/14/2019 InstructionsI have documented a list of current medications and reviewed it with the patient. I encouraged the patient to diet and exercise. DispositionReturn To Clinic in 1 year  5 level 40 minute 80% counselling        Electronically Signed by: Uvaldo Meraz MD -Author on August 14, 2019 10:15

## 2023-11-09 NOTE — PHYSICAL EXAM RECTAL:
normal tone, no external hemorrhoids, no masses palpable, no red blood, Tenderness on TIFFANI, Internal hemorrhoids present

## 2023-11-20 LAB
A/G RATIO: 2.2
ALBUMIN: 4.9
ALKALINE PHOSPHATASE: 42
ALT (SGPT): 17
ANTI-INFLIXIMAB ANTIBODY: 27
AST (SGOT): 26
BILIRUBIN, TOTAL: 0.9
BUN/CREATININE RATIO: 18
BUN: 16
C-REACTIVE PROTEIN, QUANT: 2
CALCIUM: 9.5
CARBON DIOXIDE, TOTAL: 27
CHLORIDE: 102
CREATININE: 0.87
EGFR: 115
FERRITIN, SERUM: 64
GLOBULIN, TOTAL: 2.2
GLUCOSE: 81
HEMATOCRIT: 42.8
HEMOGLOBIN: 14.1
INFLIXIMAB DRUG LEVEL: 21
IRON BIND.CAP.(TIBC): 276
IRON SATURATION: 43
IRON: 119
Lab: (no result)
MCH: 31.3
MCHC: 32.9
MCV: 95
NRBC: (no result)
PLATELETS: 224
POTASSIUM: 4.2
PROTEIN, TOTAL: 7.1
RBC: 4.51
RDW: 12.7
SEDIMENTATION RATE-WESTERGREN: 4
SODIUM: 141
UIBC: 157
WBC: 7.8

## 2023-12-10 ENCOUNTER — WEB ENCOUNTER (OUTPATIENT)
Dept: URBAN - METROPOLITAN AREA CLINIC 98 | Facility: CLINIC | Age: 38
End: 2023-12-10

## 2024-01-03 LAB
CALPROTECTIN, FECAL: 79
LACTOFERRIN, FECAL, QUANT.: 2.8
REQUEST PROBLEM: (no result)

## 2024-05-15 ENCOUNTER — OFFICE VISIT (OUTPATIENT)
Dept: URBAN - METROPOLITAN AREA CLINIC 98 | Facility: CLINIC | Age: 39
End: 2024-05-15
Payer: COMMERCIAL

## 2024-05-15 ENCOUNTER — LAB OUTSIDE AN ENCOUNTER (OUTPATIENT)
Dept: URBAN - METROPOLITAN AREA CLINIC 98 | Facility: CLINIC | Age: 39
End: 2024-05-15

## 2024-05-15 ENCOUNTER — DASHBOARD ENCOUNTERS (OUTPATIENT)
Age: 39
End: 2024-05-15

## 2024-05-15 VITALS
DIASTOLIC BLOOD PRESSURE: 74 MMHG | TEMPERATURE: 97.2 F | WEIGHT: 144.4 LBS | BODY MASS INDEX: 22.66 KG/M2 | SYSTOLIC BLOOD PRESSURE: 108 MMHG | HEIGHT: 67 IN | HEART RATE: 60 BPM

## 2024-05-15 DIAGNOSIS — K21.9 CHRONIC GERD: ICD-10-CM

## 2024-05-15 DIAGNOSIS — K51.018 CHRONIC ULCERATIVE ENTEROCOLITIS WITH OTHER COMPLICATION: ICD-10-CM

## 2024-05-15 DIAGNOSIS — K62.5 RECTAL BLEEDING: ICD-10-CM

## 2024-05-15 DIAGNOSIS — E88.09 HYPOALBUMINEMIA: ICD-10-CM

## 2024-05-15 PROBLEM — 13025001: Status: ACTIVE | Noted: 2024-05-15

## 2024-05-15 PROCEDURE — 99214 OFFICE O/P EST MOD 30 MIN: CPT | Performed by: INTERNAL MEDICINE

## 2024-05-15 RX ORDER — INFLIXIMAB 100 MG/10ML
INFUSE 10 MG/KG INJECTION, POWDER, LYOPHILIZED, FOR SOLUTION INTRAVENOUS
Qty: 7 EACH | Refills: 18 | Status: ACTIVE | COMMUNITY
Start: 2023-08-07 | End: 2026-09-01

## 2024-05-15 RX ORDER — CALCIUM CARBONATE 500(1250)
1 TABLET WITH MEALS TABLET ORAL ONCE A DAY
Status: ACTIVE | COMMUNITY

## 2024-06-27 ENCOUNTER — TELEPHONE ENCOUNTER (OUTPATIENT)
Dept: URBAN - METROPOLITAN AREA CLINIC 98 | Facility: CLINIC | Age: 39
End: 2024-06-27

## 2024-06-27 RX ORDER — INFLIXIMAB 100 MG/10ML
INFUSE 10 MG/KG INJECTION, POWDER, LYOPHILIZED, FOR SOLUTION INTRAVENOUS
Qty: 7 EACH | Refills: 18
Start: 2023-08-07 | End: 2027-05-27

## 2024-12-12 ENCOUNTER — TELEPHONE ENCOUNTER (OUTPATIENT)
Dept: URBAN - METROPOLITAN AREA CLINIC 98 | Facility: CLINIC | Age: 39
End: 2024-12-12

## 2024-12-12 RX ORDER — INFLIXIMAB 100 MG/10ML
INFUSE 10 MG/KG INJECTION, POWDER, LYOPHILIZED, FOR SOLUTION INTRAVENOUS
Qty: 7 EACH | Refills: 18
Start: 2023-08-07 | End: 2027-11-11

## 2024-12-16 ENCOUNTER — CLAIMS CREATED FROM THE CLAIM WINDOW (OUTPATIENT)
Dept: URBAN - METROPOLITAN AREA SURGERY CENTER 18 | Facility: SURGERY CENTER | Age: 39
End: 2024-12-16

## 2024-12-16 ENCOUNTER — CLAIMS CREATED FROM THE CLAIM WINDOW (OUTPATIENT)
Dept: URBAN - METROPOLITAN AREA CLINIC 4 | Facility: CLINIC | Age: 39
End: 2024-12-16
Payer: COMMERCIAL

## 2024-12-16 ENCOUNTER — CLAIMS CREATED FROM THE CLAIM WINDOW (OUTPATIENT)
Dept: URBAN - METROPOLITAN AREA SURGERY CENTER 18 | Facility: SURGERY CENTER | Age: 39
End: 2024-12-16
Payer: COMMERCIAL

## 2024-12-16 DIAGNOSIS — K63.89 OTHER SPECIFIED DISEASES OF INTESTINE: ICD-10-CM

## 2024-12-16 DIAGNOSIS — K51.40 INFLAMMATORY POLYPS OF COLON WITHOUT COMPLICATIONS: ICD-10-CM

## 2024-12-16 DIAGNOSIS — K21.9 GASTRO-ESOPHAGEAL REFLUX DISEASE WITHOUT ESOPHAGITIS: ICD-10-CM

## 2024-12-16 DIAGNOSIS — K92.2 GASTROINTESTINAL HEMORRHAGE, UNSPECIFIED GASTROINTESTINAL HEMORRHAGE TYPE: ICD-10-CM

## 2024-12-16 DIAGNOSIS — K21.9 GERD: ICD-10-CM

## 2024-12-16 DIAGNOSIS — K51.80 OTHER ULCERATIVE COLITIS WITHOUT COMPLICATIONS: ICD-10-CM

## 2024-12-16 DIAGNOSIS — K51.90 ULCERATIVE COLITIS: ICD-10-CM

## 2024-12-16 DIAGNOSIS — K31.89 OTHER DISEASES OF STOMACH AND DUODENUM: ICD-10-CM

## 2024-12-16 DIAGNOSIS — K62.89 OTHER SPECIFIED DISEASES OF ANUS AND RECTUM: ICD-10-CM

## 2024-12-16 DIAGNOSIS — K29.70 GASTRITIS, UNSPECIFIED, WITHOUT BLEEDING: ICD-10-CM

## 2024-12-16 DIAGNOSIS — D12.3 ADENOMATOUS POLYP OF TRANSVERSE COLON: ICD-10-CM

## 2024-12-16 DIAGNOSIS — D12.5 ADENOMATOUS POLYP OF SIGMOID COLON: ICD-10-CM

## 2024-12-16 DIAGNOSIS — K51.40 PSEUDOPOLYP OF SIGMOID COLON WITHOUT COMPLICATION: ICD-10-CM

## 2024-12-16 DIAGNOSIS — K51.40 PSEUDOPOLYP OF TRANSVERSE COLON WITHOUT COMPLICATION: ICD-10-CM

## 2024-12-16 PROCEDURE — 45381 COLONOSCOPY SUBMUCOUS NJX: CPT | Performed by: INTERNAL MEDICINE

## 2024-12-16 PROCEDURE — 45380 COLONOSCOPY AND BIOPSY: CPT | Performed by: INTERNAL MEDICINE

## 2024-12-16 PROCEDURE — 45385 COLONOSCOPY W/LESION REMOVAL: CPT | Performed by: INTERNAL MEDICINE

## 2024-12-16 PROCEDURE — 88313 SPECIAL STAINS GROUP 2: CPT | Performed by: PATHOLOGY

## 2024-12-16 PROCEDURE — 88312 SPECIAL STAINS GROUP 1: CPT | Performed by: PATHOLOGY

## 2024-12-16 PROCEDURE — 43239 EGD BIOPSY SINGLE/MULTIPLE: CPT | Performed by: INTERNAL MEDICINE

## 2024-12-16 PROCEDURE — 88305 TISSUE EXAM BY PATHOLOGIST: CPT | Performed by: PATHOLOGY

## 2024-12-16 PROCEDURE — 00812 ANES LWR INTST SCR COLSC: CPT | Performed by: NURSE ANESTHETIST, CERTIFIED REGISTERED

## 2024-12-16 RX ORDER — CALCIUM CARBONATE 500(1250)
1 TABLET WITH MEALS TABLET ORAL ONCE A DAY
Status: ACTIVE | COMMUNITY

## 2024-12-16 RX ORDER — INFLIXIMAB 100 MG/10ML
INFUSE 10 MG/KG INJECTION, POWDER, LYOPHILIZED, FOR SOLUTION INTRAVENOUS
Qty: 7 EACH | Refills: 18 | Status: ACTIVE | COMMUNITY
Start: 2023-08-07 | End: 2027-11-11

## 2024-12-19 ENCOUNTER — TELEPHONE ENCOUNTER (OUTPATIENT)
Dept: URBAN - METROPOLITAN AREA CLINIC 98 | Facility: CLINIC | Age: 39
End: 2024-12-19

## 2024-12-19 RX ORDER — INFLIXIMAB 100 MG/10ML
INFUSE 10 MG/KG INJECTION, POWDER, LYOPHILIZED, FOR SOLUTION INTRAVENOUS
Qty: 7 EACH | Refills: 18
Start: 2023-08-07 | End: 2027-11-18

## 2025-03-20 ENCOUNTER — OFFICE VISIT (OUTPATIENT)
Dept: URBAN - METROPOLITAN AREA CLINIC 98 | Facility: CLINIC | Age: 40
End: 2025-03-20

## 2025-04-02 ENCOUNTER — OFFICE VISIT (OUTPATIENT)
Dept: URBAN - METROPOLITAN AREA TELEHEALTH 2 | Facility: TELEHEALTH | Age: 40
End: 2025-04-02
Payer: COMMERCIAL

## 2025-04-02 DIAGNOSIS — E88.09 HYPOALBUMINEMIA: ICD-10-CM

## 2025-04-02 DIAGNOSIS — K51.40 PSEUDOPOLYPOSIS OF COLON WITHOUT COMPLICATION, UNSPECIFIED PART OF COLON: ICD-10-CM

## 2025-04-02 DIAGNOSIS — R14.2 BELCHING: ICD-10-CM

## 2025-04-02 DIAGNOSIS — K62.5 RECTAL BLEEDING: ICD-10-CM

## 2025-04-02 DIAGNOSIS — K51.018 CHRONIC ULCERATIVE ENTEROCOLITIS WITH OTHER COMPLICATION: ICD-10-CM

## 2025-04-02 DIAGNOSIS — K21.9 CHRONIC GERD: ICD-10-CM

## 2025-04-02 PROCEDURE — 99214 OFFICE O/P EST MOD 30 MIN: CPT | Performed by: INTERNAL MEDICINE

## 2025-04-02 RX ORDER — CALCIUM CARBONATE 500(1250)
1 TABLET WITH MEALS TABLET ORAL ONCE A DAY
Status: ACTIVE | COMMUNITY

## 2025-04-02 RX ORDER — INFLIXIMAB 100 MG/10ML
INFUSE 10 MG/KG INJECTION, POWDER, LYOPHILIZED, FOR SOLUTION INTRAVENOUS
Qty: 7 EACH | Refills: 18 | Status: ACTIVE | COMMUNITY
Start: 2023-08-07 | End: 2027-11-18

## 2025-04-02 NOTE — HPI-TODAY'S VISIT:
39 yo male for TH f/u .Telephone rather than telehealth due to Rakesh limited access to computer now and due to my illness. Doing well. No flares. Still has rectal bleeding. Tried the suppository, hem supp. Never stopped it. ?try mesalamine. A little blood every day.  116/24 colon and bx showed no activity of UC. Innocent inflammatory pseudopolyps bx and snared. No rectal inflammation Options. Flagyl ointment Mesalamine supp Remicade 10 mg/kg q8w. and need trough ============================= 5/15/24  38 yo male doing well. UC is stable. No pain diarrhea or bleeding. Does have a little rectal bleeding with each stool No proctitis by last exam.] Perianal is nl Try anusol supp. Has had intermittent pain. For last colonoscopy, the rectum was sore.    ============================== 11/9/23  39 yo male with UC comes in for interval f/u. Has not gottent the dose increase yet.  Last IFX level July - IFX4.6 and ATI-36  Got 6 vials last time rather than 7 vials. Should be praq0pjj 10 mg/kg q 8 weeks. will recheck TDM today  Recent colonoscopy and bx showed no inflammation - one unusual pseudopolyps bx report - of no issue. ============================== 6/23/23  39 yo male with UC returns for 12 month f//u TH visit. Not felling as well as in the past.  Belching increased tho no dysphagia. Belching is for frequent with many frequent burps - minor and it happens. EGD on 8/2/22 was nl and bx all nl ex features of reflux. Will offer 8 week course of omeprazole.  re: UC, has had 10 months ago, was switched from Remicade to Inflectra Infusions moved to [place] and past 5 infusions, every 8 weeks, and he has not felt as well. He feels more drained and his fatigue and blood in stool 2 weeks before infusion. 1. More fatigue after infusion. 2. Maxine is moving him back to Remicade and I agree. Switch back to Remicade. Was supposed to get infusion today. But delayed due to lack of ov and switch to Remicade, etc. 3. Recurrence of symptoms before next infusion has occurred more beforen next ifnusion. Now, it takes longer for the inflectra to kick in, increased syx for 2 weeks before infletra and for 1 weekd after.  Yesterday, over past week 3-4 stools a day and now getting up to 6-8 stools a day Last week, blood, Abdominal pain after the last infusion. Discomfort escalated to pain      ====================================================== 6/8/22  36 yo male returns for 6 month f/u visit.  Doing well. No pain diarrhea or bleeddng, except some stress with lack of sleep and normalized. Typically 3-5 stools a day.  No other medical issues.  Overuse joint issues, no others.  Nazia good with job. 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  ===Timothy got both doses of Pfizer vax in [place] and got boosted Oct 2021 Needs second booster now. Get another in 4-6 months. Lacto and calpro - normalized after a couople adddiotnal cycles of remicade and got back on schedule.  Burps every day. when hungry. Very common. Does get chest pain and when he burps, it goes away.      ========================= 12/13/21  35 yo male comes in with [employer]. Recent stool tests showed inflammation. Started a job, [employer].  No syx. lacto/calpro were eleated at 18.6 and 186.  Vernon level at 4 weeks was 14 on 10/10/21 with next dose on 11/5  Interval was longer maybe 12 weeks when he came back from [place] and got scheduled late so let check labs and lacto/calpro in 2 cycles more  ============================= 9/30/21  35 yo male with UC on Remicade 5 vials, Vernon brand, getting 5 vials q 8 weeks. Was seeing Dr. Phelan Last infusion was July 28, 9 weeks ago. Infusions went fine.  No pain diarrea or bleeding EIM none  Dates of pfizer vaccine - second dose Feb. Third dose today.  No primary MD  No other medical problems.  Takes zyrtec pre -Vernon Vit D Calcium  Ins [employer] Arciniega working at [employer] ---- was [facility]     ============================ 8/14/19  Follow-up UC   History Of Present Illness This is a scheduled appointment for this patient, a 34 year old /White male, after a previous visit on 08/14/2019, for a follow-up evaluation of Ulcerative colitis.   35 yo male comes in for f'u\ Last seen 6/14  Wife Demian Darling and all leave for 1 year [facility] 9/18 or so  Last Vernon level nwas 7/30 and was a 10 week interval------7.0  Prior infusion was 5/21  will check next level before departure for [place] 9/12 and departs 9/18.  Next infusion 9/12 and will be 6 week interval  No pain diarrhea bleeding. 3-5 formed stools a day.  Labs from 6/14 all wwnl.   Last dose of aza was June 14  Got Anser test denial by [employer], as ususual  Last colonoscopyu 2/26/19 near deep remission  Ame Huerta MD at [facility]  ==== 6/14/19 35 yo male with UC comes in for 3 month f/u  Getting 5 mg/kg q 8 weeks after haing high dose 10 mg/kg q 4 and then de-escalate.  Now on aza 50 mg a day, down from 4 per day in years past.  No pain diarrhea or bleeding.  Will stop aza 50 mg and get 6TG level today.  Get vernon level at next trough.  see back in 2 months.  do colonoscopy late aug or sept well before departure.  Plan--- try stopping aza get level get trought ifx at next trough, on current 5mg/kg  Leaving for [facility] 9/19 f/u 2 months ck labs do colonoscopy hopefully send on IFX monotherapy.     ============== 3/11/19  35 yo male with UC comes in for f/u.  Had colonoscopy 2/26/18 and it showed no active inflamation and normal TI.  There were diffuse pseudopolyps.  No active UC syx, but GI syx increased withj Melech associated loss of sleep.  Has been taking Canasa for the past few months and anorectal syx/bleeding resolved.  Colon and bx, including the distal rectum  He can use Canasa prn and tub bath and consider Crohns if continued syx but likely not.  Considering an extended trip to [place] this summer.  Current insurance is [employer] thru [employer].  When they called about getting Remicade in [place], they were told it was prohibitive.  Last DEXA??8/14/18 showed osteopenia.  Taking caltrate with D. No h/o kidney stones.   Last eye exam ----??   ================= 11/14/18  32 yo male with severe UC doing miuch better 3 stools a day blood from hemorrhoids or anorectum. No rectal or abd pain..  Has not used Canasa yet.  Review of labs from Oct 9 shows low alb of 2.6 and mild elevation of calpro at 250 and lacto at 33.  He does not think he was doing better on Vernon every 4 weeks but backed off due to cesar trought level 3/12/18 of 45.6 and prior low level of 9.4 in 10/30/17  Baby melech 6'13oz and he is doing great.    doing well the past few months.   taking caltrate with D.   ============  32 yo male. Syx are stable. No pain diarrhea or bleeding except once a week or so. EI- Saw Dr. Matthew for rash and dryness and hair falling out. She said it was all autoimmune Clobetasol ointment for scalp Alclometasone cream for face. Hair has not regrown. Asymmetric right post auricular.  Rectal bleeding is daily. 2-5 stools a day. Blood on wiping and not in the stool. Dexa scan done---showed osteopenia x 2 sites and normal at 1. No os Suggest caltrate with d bid.   Energy is good when he exercise and gets sleep. Weight training.  1/23/18 colonoscopy showed redness wqhich is inavtive UC as all bx were nl. Quiescent. Willl repaet Jan or earlier.  6/15/18 calpro and lacto was 3x elevated so inflammation not fully controlled tho esr and crp were nl.  Current Vernon is 10 mg/kg = 700 mg every 8 weeks. May need to shorten interval and will get TDM before next dose on Oct 9.  ========= 6/13/18 ov 32 yo male with UC dx 2015 comes in for 4 month f/u and is still doing very well.  No pain, diarrhea, or bleeding but gets these syx once a week.  AGWS.  EIM none.  Getting Remicade 10 mg/kg and "dandruff" and had it 6 months ago but did not mention.  Wife is pregnant. No concerns.  Had IFX level of 45 on 10mg /kg q 4 3/18 4 week trough and prior level when inflamed was 9.  Advised reducing to 10 mg/kg q 8 and in process.  Had hi calpro     Past Medical History Disease Name Date Onset Notes Hypoalbuminemia 11/14/2018 -- IBS (irritable bowel syndrome) unk 06/01/2016 - 05/03/2016 - 01/20/2016 - Rectal bleeding 09/14/2018 -- Ulcerative Colitis unk 07/01/2016 - 06/01/2016 - 05/03/2016 - 02/08/2016 - 01/20/2016 - 01/12/2016 - lsh 09/17/2015 - Ulcerative colitis 06/14/2019 --     Past Surgical History Procedure Name Date Notes Clavicle surgery unk 07/01/2016 - miniscusal 06/01/2016 - 06/01/2016 - unk Colonoscopy 2/26/2019 03/11/2019 - 11/14/2018 - 09/14/2018 - 06/13/2018 - 02/12/2018 -     Medication List Name Date Started Instructions Remicade 100 mg intravenous recon soln infuse 3 mg/kg over no less than 2 hour(s) by intravenous route every 8 weeks Vitamin D3 5,000 unit oral tablet --     Allergy List Allergen Name Date Reaction Notes No Known Environmental Allergies -- -- 06/01/2016 - 05/03/2016 - 01/12/2016 - [facility] 10/28/2015 - 10/07/2015 - 09/17/2015 - No Known Food Allergies -- -- 06/01/2016 - 05/03/2016 - 01/12/2016 - [facility] 10/28/2015 - 10/07/2015 - 09/17/2015 - other 7/27/2016 -- 07/27/2016 - ENTYVIO     Family Medical History Disease Name Relative/Age Notes Family history of Crohn's disease Brother/17 6/1/2016     Social History Finding Status Start/Stop Quantity Notes Alcohol Current some day --/-- -- 08/14/2019 - 01/12/2017 - 11/10/2016 - 06/01/2016 - 05/03/2016 - 02/08/2016 - 01/20/2016 - 01/12/2016 - [facility] 10/28/2015 - 10/07/2015 - 09/17/2015 - 09/02/2015 - Once per week Denies illicit substance abuse -- --/-- -- 08/14/2019 - 03/11/2019 - Tobacco Never --/-- -- 06/14/2019 - 03/11/2019 - 11/14/2018 - 09/14/2018 - 06/13/2018 - 02/12/2018 - 10/20/2017 - 06/07/2017 - 03/15/2017 - 02/20/2017 - 01/12/2017 - 12/05/2016 -     Review of Systems ConstitutionalDenies : body aches, chills, fatigue, fever, loss of appetite, malaise, night sweats, weight gain, weight loss EyesDenies : blurred vision, visual changes HENTDenies : Ear Pain/Ringing, hearing loss, Mouth Ulcers/Sores, nose bleeds, problems with gums/teeth, trouble swallowing CardiovascularDenies : chest pain, leaky heart valves, heart murmur, heart racing/skipping, high blood pressure, palpitations RespiratoryDenies : chronic cough, shortness of breath, wheezing or asthma symptoms GastrointestinalDenies : Abdominal Pain/discomfort, Anal/Rectal Pain or Itching, Anal Spasm, Black Stool, Bloating/belching/gaseouness, Change of Bowel Habit, Constipation, Diarrhea/Loose Stool, Difficulty in Swallowing, Heartburn/esophageal reflux, Hemorrhoids, Indigestion, Mucus in Stool, Nausea/vomiting, Rectal Bleeding, Unintentional Weight Loss GenitourinaryDenies : Blood in Urine, Burning/pain with Urination, frequency, Recent/frequent UTI, Kidney Stones IntegumentDenies : itching/dry skin, jaundice, rashes, bumps or sores NeurologicDenies : headaches, dizziness/vertigo, head trauma/injury, recent numbness/weakness, seizures MusculoskeletalDenies : back pain, decreased range of motion, joint pain/arthritis, Problems Walking/calf or leg pain EndocrineDenies : bruise easily, excessive thirst, heat/cold intolerance, history of high or low blood sugar PsychiatricDenies : anxiety, changes in sleep pattern, depression, loss of memory Heme-LymphDenies : Bleeding Problems, Enlarged Nodes/Swolen Glands, excessive bruising, history of anemia Allergic-ImmunologicDenies : seasonal allergies   Vitals Date Time BP Position Site L\R Cuff Size HR RR TEMP (F) WT HT BMI kg/m2 BSA m2 O2 Sat   08/14/2019 09:32 /70 Sitting 60 - R 97.9 136lbs 8oz 5' 7" 21.38 1.71     Physical Examination ConstitutionalAppearance : Well nourished, well developed patient in no distress. Ambulating without difficulty. Ability to Communicate : Normal communication ability EyesConjunctivae and Eyelids : Conjunctivae and eyelids appear normal Sclerae : White without injection HENTHead : Inspection : Normocephalic and atraumatic Face : Inspection : Face within normal limits Ears : External Ears : External ears within normal limits Nose/Nasopharynx : External Nose : External nose normal appearance, nares patent, no nasal discharge Mouth and Throat : General : Oral cavity appearance normal, breath odor normal Lips : Appearance normal NeckInspection and Palpation : Normal appearance without tenderness on palpation or deformities, trachea midline Thyroid : Normal size without tenderness, nodules or masses Jugular Veins : no JVD ChestInspection of Chest : No lesions, deformities or traumatic injuries present. No significant scars are present. Respiratory Effort : Breathing is unlabored without accessory muscle use Palpation of Chest : Chest wall non-tender with no masses present. Tactile fremitus is normal Auscultation : Normal breath sounds CardiovascularHeart : Auscultation : Heart rate is regular with normal rhythm. No murmurs are heard. No edema. GastrointestinalAbdominal Exam : Abdomen soft without rigidity or guarding, abdomen non-tender to palpation, normal bowel sounds, no masses present, non-distended Liver and Spleen : No hepatomegaly present. Liver is non-tender to palpation and spleen is not palpable. LymphaticNeck : No lymphadenopathy present Axillae : No lymphadenopathy present Groin : No lymphadenopathy present MusculoskeletalGait and Station : Normal Gait, normal station Neck/Spine/Pelvis : No tenderness of deformities present. SkinGeneral Inspection : No rashes, lesions or areas of discoloration present. Skin turgor is normal. General Palpation : No abnormalities, masses or tenderness on palpation. Neurologic and PsychiatricOrientation : Oriented to person, place and time Sensation : Normal sensation Memory : Short and long term memory intact. Mood and Affect : Normal mood with an appropriate affect     Assessment Ulcerative colitis 556.6  Hypoalbuminemia 273.8/E88.09  Rectal bleeding 569.3/K62.5  Ulcerative Colitis 556.0    Plan OrdersPatient not identified as an unhealthy alcohol user when screene () - - 08/14/2019 Influenza immunization administered or previously received () - - 08/14/2019 BMI is documented within normal parameters and no follow-up plan () - - 08/14/2019 Current tobacco non-user (CAD, cap, COPD, PV) (dm) (IBD) (1036F, ) - - 08/14/2019 Colorectal cancer screening results documented and reviewed (PV) (3017F) - - 08/14/2019 Documentation of current medications. () - - 08/14/2019 CMP (comprehensive metabolic panel) (40981) - - 08/14/2019 Sed rate (89389) - - 08/14/2019 C-reactive protein (09457) - - 08/14/2019 CBC with diff (89885) - - 08/14/2019 Infliximab Concentration and Anti-Infliximab Antibody (99212) - - 08/14/2019 InstructionsI have documented a list of current medications and reviewed it with the patient. I encouraged the patient to diet and exercise. DispositionReturn To Clinic in 1 year  5 level 40 minute 80% counselling        Electronically Signed by: Uvaldo Meraz MD -Author on August 14, 2019 10:15

## 2025-04-03 ENCOUNTER — TELEPHONE ENCOUNTER (OUTPATIENT)
Dept: URBAN - METROPOLITAN AREA CLINIC 98 | Facility: CLINIC | Age: 40
End: 2025-04-03

## 2025-04-03 RX ORDER — OMEPRAZOLE 40 MG/1
1 CAPSULE 30 MINUTES BEFORE MORNING MEAL CAPSULE, DELAYED RELEASE ORAL ONCE A DAY
Qty: 90 | Refills: 3 | OUTPATIENT
Start: 2025-04-03

## 2025-04-03 RX ORDER — MESALAMINE 1000 MG/1
1 SUPPOSITORY AT BEDTIME SUPPOSITORY RECTAL ONCE A DAY
Qty: 30 SUPPOSITORIES | Refills: 11 | OUTPATIENT
Start: 2025-04-03

## 2025-04-22 ENCOUNTER — LAB OUTSIDE AN ENCOUNTER (OUTPATIENT)
Dept: URBAN - METROPOLITAN AREA CLINIC 98 | Facility: CLINIC | Age: 40
End: 2025-04-22

## 2025-04-25 LAB
CALPROTECTIN, FECAL: <5
LACTOFERRIN, FECAL, QUANT.: <1